# Patient Record
Sex: FEMALE | Race: BLACK OR AFRICAN AMERICAN | Employment: OTHER | URBAN - METROPOLITAN AREA
[De-identification: names, ages, dates, MRNs, and addresses within clinical notes are randomized per-mention and may not be internally consistent; named-entity substitution may affect disease eponyms.]

---

## 2023-09-30 ENCOUNTER — APPOINTMENT (OUTPATIENT)
Dept: CT IMAGING | Facility: HOSPITAL | Age: 63
DRG: 698 | End: 2023-09-30
Attending: EMERGENCY MEDICINE
Payer: MEDICARE

## 2023-09-30 ENCOUNTER — HOSPITAL ENCOUNTER (EMERGENCY)
Facility: HOSPITAL | Age: 63
Discharge: HOME | End: 2023-09-30
Attending: EMERGENCY MEDICINE
Payer: MEDICARE

## 2023-09-30 ENCOUNTER — APPOINTMENT (OUTPATIENT)
Dept: CT IMAGING | Facility: HOSPITAL | Age: 63
End: 2023-09-30
Attending: EMERGENCY MEDICINE
Payer: MEDICARE

## 2023-09-30 ENCOUNTER — HOSPITAL ENCOUNTER (EMERGENCY)
Facility: HOSPITAL | Age: 63
Discharge: HOME | DRG: 698 | End: 2023-09-30
Attending: EMERGENCY MEDICINE
Payer: MEDICARE

## 2023-09-30 VITALS
HEART RATE: 87 BPM | TEMPERATURE: 98 F | RESPIRATION RATE: 16 BRPM | OXYGEN SATURATION: 98 % | SYSTOLIC BLOOD PRESSURE: 119 MMHG | DIASTOLIC BLOOD PRESSURE: 72 MMHG | WEIGHT: 120 LBS

## 2023-09-30 DIAGNOSIS — N39.0 URINARY TRACT INFECTION ASSOCIATED WITH INDWELLING URETHRAL CATHETER, INITIAL ENCOUNTER: Primary | ICD-10-CM

## 2023-09-30 DIAGNOSIS — T83.511A URINARY TRACT INFECTION ASSOCIATED WITH INDWELLING URETHRAL CATHETER, INITIAL ENCOUNTER: Primary | ICD-10-CM

## 2023-09-30 LAB
ALBUMIN SERPL-MCNC: 2.3 G/DL (ref 3.4–5)
ALBUMIN/GLOB SERPL: 0.4 {RATIO} (ref 1–2)
ALP LIVER SERPL-CCNC: 131 U/L
ALT SERPL-CCNC: 10 U/L
ANION GAP SERPL CALC-SCNC: 12 MMOL/L (ref 0–18)
APAP SERPL-MCNC: <2 UG/ML (ref 10–30)
AST SERPL-CCNC: 16 U/L (ref 15–37)
BASOPHILS # BLD AUTO: 0.01 X10(3) UL (ref 0–0.2)
BASOPHILS NFR BLD AUTO: 0.1 %
BILIRUB SERPL-MCNC: 0.3 MG/DL (ref 0.1–2)
BILIRUB UR QL CFM: NEGATIVE
BUN BLD-MCNC: 13 MG/DL (ref 7–18)
BUN/CREAT SERPL: 14.4 (ref 10–20)
CALCIUM BLD-MCNC: 10.8 MG/DL (ref 8.5–10.1)
CHLORIDE SERPL-SCNC: 106 MMOL/L (ref 98–112)
CO2 SERPL-SCNC: 21 MMOL/L (ref 21–32)
COLOR UR: YELLOW
CREAT BLD-MCNC: 0.9 MG/DL
DEPRECATED RDW RBC AUTO: 59.3 FL (ref 35.1–46.3)
EGFRCR SERPLBLD CKD-EPI 2021: 73 ML/MIN/1.73M2 (ref 60–?)
EOSINOPHIL # BLD AUTO: 0.06 X10(3) UL (ref 0–0.7)
EOSINOPHIL NFR BLD AUTO: 0.6 %
ERYTHROCYTE [DISTWIDTH] IN BLOOD BY AUTOMATED COUNT: 17.4 % (ref 11–15)
ETHANOL SERPL-MCNC: <3 MG/DL (ref ?–3)
GLOBULIN PLAS-MCNC: 5.6 G/DL (ref 2.8–4.4)
GLUCOSE BLD-MCNC: 74 MG/DL (ref 70–99)
GLUCOSE UR-MCNC: NORMAL MG/DL
HCT VFR BLD AUTO: 27 %
HGB BLD-MCNC: 8.4 G/DL
HYALINE CASTS #/AREA URNS AUTO: PRESENT /LPF
IMM GRANULOCYTES # BLD AUTO: 0.04 X10(3) UL (ref 0–1)
IMM GRANULOCYTES NFR BLD: 0.4 %
KETONES UR-MCNC: 40 MG/DL
LEUKOCYTE ESTERASE UR QL STRIP.AUTO: 500
LYMPHOCYTES # BLD AUTO: 2.03 X10(3) UL (ref 1–4)
LYMPHOCYTES NFR BLD AUTO: 20.6 %
MCH RBC QN AUTO: 29 PG (ref 26–34)
MCHC RBC AUTO-ENTMCNC: 31.1 G/DL (ref 31–37)
MCV RBC AUTO: 93.1 FL
MONOCYTES # BLD AUTO: 0.79 X10(3) UL (ref 0.1–1)
MONOCYTES NFR BLD AUTO: 8 %
NEUTROPHILS # BLD AUTO: 6.91 X10 (3) UL (ref 1.5–7.7)
NEUTROPHILS # BLD AUTO: 6.91 X10(3) UL (ref 1.5–7.7)
NEUTROPHILS NFR BLD AUTO: 70.3 %
OSMOLALITY SERPL CALC.SUM OF ELEC: 287 MOSM/KG (ref 275–295)
PH UR: 6 [PH] (ref 5–8)
PLATELET # BLD AUTO: 497 10(3)UL (ref 150–450)
POTASSIUM SERPL-SCNC: 4.3 MMOL/L (ref 3.5–5.1)
PROT SERPL-MCNC: 7.9 G/DL (ref 6.4–8.2)
PROT UR-MCNC: 300 MG/DL
RBC # BLD AUTO: 2.9 X10(6)UL
RBC #/AREA URNS AUTO: >10 /HPF
SALICYLATES SERPL-MCNC: 2.1 MG/DL (ref 2.8–20)
SARS-COV-2 RNA RESP QL NAA+PROBE: NOT DETECTED
SODIUM SERPL-SCNC: 139 MMOL/L (ref 136–145)
SP GR UR STRIP: 1.02 (ref 1–1.03)
UROBILINOGEN UR STRIP-ACNC: NORMAL
WBC # BLD AUTO: 9.8 X10(3) UL (ref 4–11)
WBC #/AREA URNS AUTO: >50 /HPF

## 2023-09-30 PROCEDURE — 96372 THER/PROPH/DIAG INJ SC/IM: CPT

## 2023-09-30 PROCEDURE — 93005 ELECTROCARDIOGRAM TRACING: CPT

## 2023-09-30 PROCEDURE — 80053 COMPREHEN METABOLIC PANEL: CPT | Performed by: EMERGENCY MEDICINE

## 2023-09-30 PROCEDURE — 87086 URINE CULTURE/COLONY COUNT: CPT | Performed by: EMERGENCY MEDICINE

## 2023-09-30 PROCEDURE — 99285 EMERGENCY DEPT VISIT HI MDM: CPT

## 2023-09-30 PROCEDURE — 85025 COMPLETE CBC W/AUTO DIFF WBC: CPT | Performed by: EMERGENCY MEDICINE

## 2023-09-30 PROCEDURE — 80143 DRUG ASSAY ACETAMINOPHEN: CPT | Performed by: EMERGENCY MEDICINE

## 2023-09-30 PROCEDURE — 80179 DRUG ASSAY SALICYLATE: CPT | Performed by: EMERGENCY MEDICINE

## 2023-09-30 PROCEDURE — 70450 CT HEAD/BRAIN W/O DYE: CPT | Performed by: EMERGENCY MEDICINE

## 2023-09-30 PROCEDURE — 81001 URINALYSIS AUTO W/SCOPE: CPT | Performed by: EMERGENCY MEDICINE

## 2023-09-30 PROCEDURE — 82077 ASSAY SPEC XCP UR&BREATH IA: CPT | Performed by: EMERGENCY MEDICINE

## 2023-09-30 PROCEDURE — 93010 ELECTROCARDIOGRAM REPORT: CPT

## 2023-09-30 PROCEDURE — 36415 COLL VENOUS BLD VENIPUNCTURE: CPT

## 2023-09-30 RX ORDER — DIPHENHYDRAMINE HYDROCHLORIDE 50 MG/ML
50 INJECTION INTRAMUSCULAR; INTRAVENOUS ONCE
Status: COMPLETED | OUTPATIENT
Start: 2023-09-30 | End: 2023-09-30

## 2023-09-30 RX ORDER — CEPHALEXIN 500 MG/1
500 CAPSULE ORAL 4 TIMES DAILY
Qty: 40 CAPSULE | Refills: 0 | Status: SHIPPED | OUTPATIENT
Start: 2023-09-30 | End: 2023-10-10

## 2023-09-30 RX ORDER — CEPHALEXIN 500 MG/1
500 CAPSULE ORAL ONCE
Status: COMPLETED | OUTPATIENT
Start: 2023-09-30 | End: 2023-09-30

## 2023-09-30 NOTE — ED INITIAL ASSESSMENT (HPI)
Brought by [facility] from [facility] for worsening zayra and paranoia x2 days. Per EMS, patient is normally AOx2. Over the past 2 days she has been stating that \"people are going to cut open my guts and feed them to me, feed them to three big black dogs. \" Having visual hallucinations of people sitting in the corner. On arrival she is jittery, speaking rapidly. Denies SI/HI. Denies pain. NILE GARDNER. Jose in place from [facility]. No personal belongings with patient.   Care companion at bedside for 1:1 observation

## 2023-10-01 ENCOUNTER — HOSPITAL ENCOUNTER (INPATIENT)
Facility: HOSPITAL | Age: 63
LOS: 8 days | Discharge: NURSING, REHAB OR LONG-TERM CARE | End: 2023-10-10
Attending: EMERGENCY MEDICINE | Admitting: INTERNAL MEDICINE
Payer: MEDICARE

## 2023-10-01 ENCOUNTER — HOSPITAL ENCOUNTER (INPATIENT)
Facility: HOSPITAL | Age: 63
LOS: 8 days | Discharge: NURSING, REHAB OR LONG-TERM CARE | DRG: 698 | End: 2023-10-10
Attending: EMERGENCY MEDICINE | Admitting: INTERNAL MEDICINE
Payer: MEDICARE

## 2023-10-01 DIAGNOSIS — N17.9 AKI (ACUTE KIDNEY INJURY) (HCC): ICD-10-CM

## 2023-10-01 DIAGNOSIS — A41.9 SEPSIS DUE TO URINARY TRACT INFECTION: ICD-10-CM

## 2023-10-01 DIAGNOSIS — N39.0 SEPSIS DUE TO URINARY TRACT INFECTION: ICD-10-CM

## 2023-10-01 DIAGNOSIS — N39.0 URINARY TRACT INFECTION ASSOCIATED WITH INDWELLING URETHRAL CATHETER, INITIAL ENCOUNTER: Primary | ICD-10-CM

## 2023-10-01 DIAGNOSIS — T83.511A URINARY TRACT INFECTION ASSOCIATED WITH INDWELLING URETHRAL CATHETER, INITIAL ENCOUNTER: Primary | ICD-10-CM

## 2023-10-01 LAB
ALBUMIN SERPL-MCNC: 2.3 G/DL (ref 3.4–5)
ALBUMIN/GLOB SERPL: 0.4 {RATIO} (ref 1–2)
ALP LIVER SERPL-CCNC: 113 U/L
ALT SERPL-CCNC: 12 U/L
ANION GAP SERPL CALC-SCNC: 12 MMOL/L (ref 0–18)
AST SERPL-CCNC: 20 U/L (ref 15–37)
ATRIAL RATE: 104 BPM
BASOPHILS # BLD AUTO: 0.01 X10(3) UL (ref 0–0.2)
BASOPHILS NFR BLD AUTO: 0.1 %
BILIRUB SERPL-MCNC: 0.4 MG/DL (ref 0.1–2)
BUN BLD-MCNC: 19 MG/DL (ref 7–18)
BUN/CREAT SERPL: 9.9 (ref 10–20)
CALCIUM BLD-MCNC: 11.3 MG/DL (ref 8.5–10.1)
CHLORIDE SERPL-SCNC: 112 MMOL/L (ref 98–112)
CO2 SERPL-SCNC: 22 MMOL/L (ref 21–32)
CREAT BLD-MCNC: 1.92 MG/DL
DEPRECATED RDW RBC AUTO: 58.6 FL (ref 35.1–46.3)
EGFRCR SERPLBLD CKD-EPI 2021: 29 ML/MIN/1.73M2 (ref 60–?)
EOSINOPHIL # BLD AUTO: 0 X10(3) UL (ref 0–0.7)
EOSINOPHIL NFR BLD AUTO: 0 %
ERYTHROCYTE [DISTWIDTH] IN BLOOD BY AUTOMATED COUNT: 17.4 % (ref 11–15)
GLOBULIN PLAS-MCNC: 5.3 G/DL (ref 2.8–4.4)
GLUCOSE BLD-MCNC: 110 MG/DL (ref 70–99)
HCT VFR BLD AUTO: 26.2 %
HGB BLD-MCNC: 8 G/DL
IMM GRANULOCYTES # BLD AUTO: 0.07 X10(3) UL (ref 0–1)
IMM GRANULOCYTES NFR BLD: 0.5 %
LACTATE SERPL-SCNC: 3.3 MMOL/L (ref 0.4–2)
LYMPHOCYTES # BLD AUTO: 1.53 X10(3) UL (ref 1–4)
LYMPHOCYTES NFR BLD AUTO: 11.8 %
MCH RBC QN AUTO: 28.5 PG (ref 26–34)
MCHC RBC AUTO-ENTMCNC: 30.5 G/DL (ref 31–37)
MCV RBC AUTO: 93.2 FL
MONOCYTES # BLD AUTO: 0.88 X10(3) UL (ref 0.1–1)
MONOCYTES NFR BLD AUTO: 6.8 %
NEUTROPHILS # BLD AUTO: 10.53 X10 (3) UL (ref 1.5–7.7)
NEUTROPHILS # BLD AUTO: 10.53 X10(3) UL (ref 1.5–7.7)
NEUTROPHILS NFR BLD AUTO: 80.8 %
OSMOLALITY SERPL CALC.SUM OF ELEC: 305 MOSM/KG (ref 275–295)
P AXIS: 40 DEGREES
P-R INTERVAL: 144 MS
PLATELET # BLD AUTO: 515 10(3)UL (ref 150–450)
POTASSIUM SERPL-SCNC: 4.3 MMOL/L (ref 3.5–5.1)
PROT SERPL-MCNC: 7.6 G/DL (ref 6.4–8.2)
Q-T INTERVAL: 350 MS
QRS DURATION: 82 MS
QTC CALCULATION (BEZET): 460 MS
R AXIS: 37 DEGREES
RBC # BLD AUTO: 2.81 X10(6)UL
SODIUM SERPL-SCNC: 146 MMOL/L (ref 136–145)
T AXIS: 35 DEGREES
VENTRICULAR RATE: 104 BPM
WBC # BLD AUTO: 13 X10(3) UL (ref 4–11)

## 2023-10-02 ENCOUNTER — APPOINTMENT (OUTPATIENT)
Dept: CT IMAGING | Facility: HOSPITAL | Age: 63
End: 2023-10-02
Attending: STUDENT IN AN ORGANIZED HEALTH CARE EDUCATION/TRAINING PROGRAM
Payer: MEDICARE

## 2023-10-02 ENCOUNTER — APPOINTMENT (OUTPATIENT)
Dept: ULTRASOUND IMAGING | Facility: HOSPITAL | Age: 63
End: 2023-10-02
Attending: NURSE PRACTITIONER
Payer: MEDICARE

## 2023-10-02 ENCOUNTER — APPOINTMENT (OUTPATIENT)
Dept: CT IMAGING | Facility: HOSPITAL | Age: 63
DRG: 698 | End: 2023-10-02
Attending: STUDENT IN AN ORGANIZED HEALTH CARE EDUCATION/TRAINING PROGRAM
Payer: MEDICARE

## 2023-10-02 ENCOUNTER — APPOINTMENT (OUTPATIENT)
Dept: ULTRASOUND IMAGING | Facility: HOSPITAL | Age: 63
DRG: 698 | End: 2023-10-02
Attending: NURSE PRACTITIONER
Payer: MEDICARE

## 2023-10-02 PROBLEM — A41.9 SEPSIS DUE TO URINARY TRACT INFECTION: Status: ACTIVE | Noted: 2023-10-02

## 2023-10-02 PROBLEM — D62 ABLA (ACUTE BLOOD LOSS ANEMIA): Status: ACTIVE | Noted: 2023-10-02

## 2023-10-02 PROBLEM — N17.9 AKI (ACUTE KIDNEY INJURY) (HCC): Status: ACTIVE | Noted: 2023-10-02

## 2023-10-02 PROBLEM — N39.0 SEPSIS DUE TO URINARY TRACT INFECTION: Status: ACTIVE | Noted: 2023-10-02

## 2023-10-02 LAB
ANION GAP SERPL CALC-SCNC: 11 MMOL/L (ref 0–18)
ANION GAP SERPL CALC-SCNC: 16 MMOL/L (ref 0–18)
ANTIBODY SCREEN: NEGATIVE
ATRIAL RATE: 111 BPM
BASOPHILS # BLD AUTO: 0.02 X10(3) UL (ref 0–0.2)
BASOPHILS NFR BLD AUTO: 0.1 %
BUN BLD-MCNC: 18 MG/DL (ref 7–18)
BUN BLD-MCNC: 18 MG/DL (ref 7–18)
BUN/CREAT SERPL: 10.2 (ref 10–20)
BUN/CREAT SERPL: 12.2 (ref 10–20)
CALCIUM BLD-MCNC: 10.1 MG/DL (ref 8.5–10.1)
CALCIUM BLD-MCNC: 9.8 MG/DL (ref 8.5–10.1)
CHLORIDE SERPL-SCNC: 121 MMOL/L (ref 98–112)
CHLORIDE SERPL-SCNC: 122 MMOL/L (ref 98–112)
CO2 SERPL-SCNC: 13 MMOL/L (ref 21–32)
CO2 SERPL-SCNC: 18 MMOL/L (ref 21–32)
CREAT BLD-MCNC: 1.48 MG/DL
CREAT BLD-MCNC: 1.77 MG/DL
DEPRECATED HBV CORE AB SER IA-ACNC: 2522 NG/ML
DEPRECATED RDW RBC AUTO: 63.1 FL (ref 35.1–46.3)
EGFRCR SERPLBLD CKD-EPI 2021: 32 ML/MIN/1.73M2 (ref 60–?)
EGFRCR SERPLBLD CKD-EPI 2021: 40 ML/MIN/1.73M2 (ref 60–?)
EOSINOPHIL # BLD AUTO: 0 X10(3) UL (ref 0–0.7)
EOSINOPHIL NFR BLD AUTO: 0 %
ERYTHROCYTE [DISTWIDTH] IN BLOOD BY AUTOMATED COUNT: 17.8 % (ref 11–15)
EST. AVERAGE GLUCOSE BLD GHB EST-MCNC: 105 MG/DL (ref 68–126)
GLUCOSE BLD-MCNC: 108 MG/DL (ref 70–99)
GLUCOSE BLD-MCNC: 95 MG/DL (ref 70–99)
GLUCOSE BLDC GLUCOMTR-MCNC: 94 MG/DL (ref 70–99)
HBA1C MFR BLD: 5.3 % (ref ?–5.7)
HCT VFR BLD AUTO: 21.5 %
HCT VFR BLD AUTO: 25.5 %
HGB BLD-MCNC: 6.3 G/DL
HGB BLD-MCNC: 7.9 G/DL
HGB RETIC QN AUTO: 28.3 PG (ref 28.2–36.6)
IMM GRANULOCYTES # BLD AUTO: 0.07 X10(3) UL (ref 0–1)
IMM GRANULOCYTES NFR BLD: 0.5 %
IMM RETICS NFR: 0.17 RATIO (ref 0.1–0.3)
IRON SATN MFR SERPL: 20 %
IRON SERPL-MCNC: 30 UG/DL
LACTATE SERPL-SCNC: 1.9 MMOL/L (ref 0.4–2)
LACTATE SERPL-SCNC: 4.6 MMOL/L (ref 0.4–2)
LYMPHOCYTES # BLD AUTO: 1.5 X10(3) UL (ref 1–4)
LYMPHOCYTES NFR BLD AUTO: 11.1 %
MCH RBC QN AUTO: 28.4 PG (ref 26–34)
MCHC RBC AUTO-ENTMCNC: 29.3 G/DL (ref 31–37)
MCV RBC AUTO: 96.8 FL
MONOCYTES # BLD AUTO: 0.9 X10(3) UL (ref 0.1–1)
MONOCYTES NFR BLD AUTO: 6.7 %
MRSA DNA SPEC QL NAA+PROBE: POSITIVE
NEUTROPHILS # BLD AUTO: 11 X10 (3) UL (ref 1.5–7.7)
NEUTROPHILS # BLD AUTO: 11 X10(3) UL (ref 1.5–7.7)
NEUTROPHILS NFR BLD AUTO: 81.6 %
OSMOLALITY SERPL CALC.SUM OF ELEC: 312 MOSM/KG (ref 275–295)
OSMOLALITY SERPL CALC.SUM OF ELEC: 314 MOSM/KG (ref 275–295)
P AXIS: 108 DEGREES
P-R INTERVAL: 136 MS
PLATELET # BLD AUTO: 419 10(3)UL (ref 150–450)
POTASSIUM SERPL-SCNC: 4.3 MMOL/L (ref 3.5–5.1)
POTASSIUM SERPL-SCNC: 4.3 MMOL/L (ref 3.5–5.1)
Q-T INTERVAL: 330 MS
QRS DURATION: 80 MS
QTC CALCULATION (BEZET): 448 MS
R AXIS: 70 DEGREES
RBC # BLD AUTO: 2.22 X10(6)UL
RETICS # AUTO: 16.2 X10(3) UL (ref 22.5–147.5)
RETICS/RBC NFR AUTO: 0.7 %
RH BLOOD TYPE: POSITIVE
RH BLOOD TYPE: POSITIVE
SARS-COV-2 RNA RESP QL NAA+PROBE: NOT DETECTED
SODIUM SERPL-SCNC: 150 MMOL/L (ref 136–145)
SODIUM SERPL-SCNC: 151 MMOL/L (ref 136–145)
T AXIS: -26 DEGREES
TIBC SERPL-MCNC: 152 UG/DL (ref 240–450)
TRANSFERRIN SERPL-MCNC: 102 MG/DL (ref 200–360)
VENTRICULAR RATE: 111 BPM
WBC # BLD AUTO: 13.5 X10(3) UL (ref 4–11)

## 2023-10-02 PROCEDURE — 95816 EEG AWAKE AND DROWSY: CPT | Performed by: OTHER

## 2023-10-02 PROCEDURE — 99223 1ST HOSP IP/OBS HIGH 75: CPT | Performed by: INTERNAL MEDICINE

## 2023-10-02 PROCEDURE — 99233 SBSQ HOSP IP/OBS HIGH 50: CPT | Performed by: NURSE PRACTITIONER

## 2023-10-02 PROCEDURE — 36620 INSERTION CATHETER ARTERY: CPT | Performed by: EMERGENCY MEDICINE

## 2023-10-02 PROCEDURE — 70450 CT HEAD/BRAIN W/O DYE: CPT | Performed by: STUDENT IN AN ORGANIZED HEALTH CARE EDUCATION/TRAINING PROGRAM

## 2023-10-02 PROCEDURE — 76770 US EXAM ABDO BACK WALL COMP: CPT | Performed by: NURSE PRACTITIONER

## 2023-10-02 PROCEDURE — 30233N1 TRANSFUSION OF NONAUTOLOGOUS RED BLOOD CELLS INTO PERIPHERAL VEIN, PERCUTANEOUS APPROACH: ICD-10-PCS | Performed by: EMERGENCY MEDICINE

## 2023-10-02 RX ORDER — ACETAMINOPHEN 500 MG
500 TABLET ORAL EVERY 4 HOURS PRN
Status: DISCONTINUED | OUTPATIENT
Start: 2023-10-02 | End: 2023-10-10

## 2023-10-02 RX ORDER — OMEPRAZOLE 20 MG/1
20 CAPSULE, DELAYED RELEASE ORAL
COMMUNITY

## 2023-10-02 RX ORDER — SODIUM BICARBONATE 650 MG/1
650 TABLET ORAL 3 TIMES DAILY
COMMUNITY

## 2023-10-02 RX ORDER — HEPARIN SODIUM 5000 [USP'U]/ML
5000 INJECTION, SOLUTION INTRAVENOUS; SUBCUTANEOUS EVERY 8 HOURS SCHEDULED
Status: DISCONTINUED | OUTPATIENT
Start: 2023-10-02 | End: 2023-10-10

## 2023-10-02 RX ORDER — ASPIRIN 81 MG/1
81 TABLET ORAL DAILY
COMMUNITY

## 2023-10-02 RX ORDER — SENNOSIDES 8.6 MG
17.2 TABLET ORAL NIGHTLY PRN
Status: DISCONTINUED | OUTPATIENT
Start: 2023-10-02 | End: 2023-10-10

## 2023-10-02 RX ORDER — SODIUM CHLORIDE 9 MG/ML
INJECTION, SOLUTION INTRAVENOUS CONTINUOUS
Status: DISCONTINUED | OUTPATIENT
Start: 2023-10-02 | End: 2023-10-02

## 2023-10-02 RX ORDER — SODIUM CHLORIDE 9 MG/ML
INJECTION, SOLUTION INTRAVENOUS ONCE
Status: COMPLETED | OUTPATIENT
Start: 2023-10-02 | End: 2023-10-02

## 2023-10-02 RX ORDER — POLYETHYLENE GLYCOL 3350 17 G/17G
17 POWDER, FOR SOLUTION ORAL DAILY PRN
Status: DISCONTINUED | OUTPATIENT
Start: 2023-10-02 | End: 2023-10-10

## 2023-10-02 RX ORDER — BISACODYL 10 MG
10 SUPPOSITORY, RECTAL RECTAL
Status: DISCONTINUED | OUTPATIENT
Start: 2023-10-02 | End: 2023-10-10

## 2023-10-02 RX ORDER — ENEMA 19; 7 G/133ML; G/133ML
1 ENEMA RECTAL ONCE AS NEEDED
Status: DISCONTINUED | OUTPATIENT
Start: 2023-10-02 | End: 2023-10-10

## 2023-10-02 RX ORDER — ATORVASTATIN CALCIUM 20 MG/1
20 TABLET, FILM COATED ORAL NIGHTLY
COMMUNITY

## 2023-10-02 RX ORDER — SODIUM CHLORIDE 450 MG/100ML
INJECTION, SOLUTION INTRAVENOUS CONTINUOUS
Status: DISCONTINUED | OUTPATIENT
Start: 2023-10-02 | End: 2023-10-05

## 2023-10-02 RX ORDER — HALOPERIDOL 5 MG/ML
2.5 INJECTION INTRAMUSCULAR ONCE
Status: COMPLETED | OUTPATIENT
Start: 2023-10-02 | End: 2023-10-02

## 2023-10-02 RX ORDER — MAGNESIUM OXIDE 400 MG/1
400 TABLET ORAL 2 TIMES DAILY
COMMUNITY

## 2023-10-02 NOTE — ED PROVIDER NOTES
Patient signed out to me by previous provider as awaiting admission, getting sepsis bolus. I received a call from the lab that her lactic acid went up, the patient is still getting her sepsis bolus. Will upgrade to PCU. Patient still remains mildly tacky, BP holding. I notified the hospitalist who is aware.

## 2023-10-02 NOTE — CM/SW NOTE
From [facility] - referral to be sent to them. Will request an update on Eda's status as HANDY or LTC.     Karson Jorge MBA BSN RN CRRN   RN Case Manager  [phone]

## 2023-10-02 NOTE — ED QUICK NOTES
Orders for admission, patient is aware of plan and ready to go upstairs. Any questions, please call ED RN Bird Carter at extension [phone].      Patient Covid vaccination status: Unvaccinated     COVID Test Ordered in ED: None    COVID Suspicion at Admission: N/A    Running Infusions:      Mental Status/LOC at time of transport: a&ox1    Other pertinent information:   CIWA score: N/A   NIH score:  N/A

## 2023-10-02 NOTE — PROGRESS NOTES
On call Barberist 10/02/23    Called to assist with inability to obtain accurate blood pressure. Request for arterial line. R radial site prepped, able to palpate r radial and ulnar pulses, collateral flow confirmed. Hibicleans prep, 20 gauge art line placed and anchored in place. Good waveform with sbp in 110's. Nurse notified  also hgb dropped from 8 to 6.3 did receive sig fluid bolus. Will transfuse 1 unit prbc.

## 2023-10-02 NOTE — SLP NOTE
ADULT SWALLOWING EVALUATION    ASSESSMENT    ASSESSMENT/OVERALL IMPRESSION:  PPE REQUIRED. THIS THERAPIST WORE GLOVES, DROPLET MASK, AND GOGGLES FOR DURATION OF EVALUATION. HANDS WASHED UPON ENTRANCE/EXIT. SLP BSSE orders received and acknowledged. A swallow evaluation warranted as pt at risk for aspiration. Pt afebrile with weak vocal quality, on room air, with oxygen saturation at 100. Pt with no prior hx of dysphagia at [address] [place] [address]. Pt positioned upright in bed with bilateral wrist restraints. [facility] exam completed and overall reduced strength, range of motion, and rate of motion observed. Pt with impaired oral acceptance and bilabial seal observed intermittently. Pt with increased NAOMI. Pt's swallow response appears delayed with reduced hyolaryngeal elevation/excursion. No clinical signs of aspiration (e.g., immediate/delayed throat clear, immediate/delayed cough, wet vocal quality, increased O2 effort) observed across all trials of puree and mildly thick liquids via tsp. Pt uncoordinated with cup sips. Overt signs/symptoms of aspiration observed with thin liquids as evidenced by throat clearing. Advanced solids not administered due to declining RADHA. Oxygen status remained >98 t/o the entire evaluation. Oral cares completed following trials. At this time, pt presents with mild oral dysphagia and probable pharyngeal dysfunction. Recommend a puree diet and mildly thick liquids via tsp with strict adherence to safe swallowing compensatory strategies. Results and recommendations reviewed with RN and family. SLP collaborated with RN for MD diet orders. FCM SCORE: 3/7     PLAN: SLP to f/u x4 meal assessments, monitor CXR, and VFSS if clinically warranted. RECOMMENDATIONS   Diet Recommendations - Solids: Puree  Diet Recommendations - Liquids: Nectar thick liquids/ Mildly thick    Compensatory Strategies Recommended: Liquids via spoon; No straws; Slow rate; Alternate consistencies;Small bites and sips  Aspiration Precautions: Upright position; Slow rate;Small bites and sips; No straw  Medication Administration Recommendations: Crushed in puree  Treatment Plan/Recommendations: Aspiration precautions  Discharge Recommendations/Plan: Undetermined    HISTORY   MEDICAL HISTORY  Reason for Referral: R/O aspiration    Problem List  Principal Problem:    Urinary tract infection associated with indwelling urethral catheter, initial encounter   Active Problems:    TYSON (acute kidney injury) ([facility]    Sepsis due to urinary tract infection       Past Medical History  Past Medical History:   Diagnosis Date    Anemia     Arrhythmia     Congestive heart disease ([facility]     Deep vein thrombosis (HCC)     Disorder of thyroid     Essential hypertension     High blood pressure     High cholesterol     Pulmonary embolism ([facility]        Prior Living Situation: Skilled nursing facility  Diet Prior to Admission: Regular; Thin liquids  Precautions: Aspiration; Restraints    Patient/Family Goals: none stated    SWALLOWING HISTORY  Current Diet Consistency: NPO  Dysphagia History: None at [facility]    Imaging Results:     CT BRAIN 10/2/23:  CONCLUSION:   1. No acute intracranial process by noncontrast CT technique. 2. Chronic right cerebellar lacunar infarct versus perivascular space. [facility]      Dictated by (CST): Toshia Neri MD on 10/02/2023 at 9:44 AM       Finalized by (CST): Toshia Neri MD on 10/02/2023 at 9:47 AM              OBJECTIVE   ORAL MOTOR EXAMINATION  Dentition: Natural (scattered)  Symmetry: Within Functional Limits  Strength:  (reduced)     Range of Motion:  (reduced)  Rate of Motion: Reduced    Voice Quality: Weak  Respiratory Status: Unlabored  Consistencies Trialed: Thin liquids; Nectar thick liquids;Puree  Method of Presentation: Staff/Clinician assistance;Spoon;Cup;Single sips  Patient Positioning: Upright;Midline    Oral Phase of Swallow: Impaired  Bolus Retrieval: Impaired  Bilabial Seal: Impaired  Bolus Formation: Impaired  Bolus Propulsion: Impaired     Retention: Impaired    Pharyngeal Phase of Swallow: Impaired  Laryngeal Elevation Timing: Appears impaired  Laryngeal Elevation Strength: Appears impaired     (Please note: Silent aspiration cannot be evaluated clinically. Videofluoroscopic Swallow Study is required to rule-out silent aspiration.)        GOALS  Goal #1 The patient will tolerate puree consistency and tsp of mildly thick liquids without overt signs or symptoms of aspiration with 100 % accuracy over 2 session(s). In Progress   Goal #2 The patient/family/caregiver will demonstrate understanding and implementation of aspiration precautions and swallow strategies independently over 4 session(s). In Progress   Goal #3 The patient will tolerate trial upgrade of advanced solids consistency and thin liquids without overt signs or symptoms of aspiration with 95 % accuracy over 3 session(s). In Progress   Goal #4 The patient will utilize compensatory strategies as outlined by  BSSE (clinical evaluation) including Slow rate, Small bites, Small sips, Alternate liquids/solids, No straws, Upright 90 degrees, Eliminate distractions, Feed patient with 1:1 feed assistance 100 % of the time across 2 sessions. In Progress       FOLLOW UP  Treatment Plan/Recommendations: Aspiration precautions  Number of Visits to Meet Established Goals: 4  Follow Up Needed (Documentation Required): Yes  SLP Follow-up Date: 10/03/23    Thank you for your referral.   If you have any questions, please contact Maria Esther Mathew, SIA Acevedo  Speech Language Pathologist  Phone Number [phone]

## 2023-10-02 NOTE — CM/SW NOTE
Department  notified of request for rubi MURRELL referrals started. Assigned CM/SW to follow up with pt/family on further discharge planning.      Nara Marie   , [facility]

## 2023-10-02 NOTE — PLAN OF CARE
Restraints maintained for safety measures and protection of arterial line.      Problem: Safety Risk - Non-Violent Restraints  Goal: Patient will remain free from self-harm  Description: INTERVENTIONS:  - Apply the least restrictive restraint to prevent harm  - Notify patient and family of reasons restraints applied  - Assess for any contributing factors to confusion (electrolyte disturbances, delirium, medications)  - Discontinue any unnecessary medical devices as soon as possible  - Assess the patient's physical comfort, circulation, skin condition, hydration, nutrition and elimination needs   - Reorient and redirection as needed  - Assess for the need to continue restraints  Outcome: Progressing

## 2023-10-02 NOTE — CM/SW NOTE
2020: Received a call from Dr. Jorgito Casillas requesting assistance to transfer patient to [facility] per family request. [facility] reached out to [facility] and spoke to [place], face sheet faxed. ER MDs number given as requested for MD to MD report.  2109. EVIE didn't get a call from [facility]. [facility] [place] there bed availability is tight but they are still working on it. They will call MD once they have it figured out. EVIE made aware of the above. 2156: Per ER JERED Soto patient transfer not accepted by [facility].

## 2023-10-02 NOTE — PROCEDURES
EEG report    REFERRING PHYSICIAN: Dick Whiteside,*    PCP and phone number:  No primary care provider on file. None    TECHNIQUE: 21 channels of EEG, 2 channels of EOG, and 1 channel of EKG were recorded utilizing the International 10/20 System. The recording was performed in a digitized monopolar referential format and playback was reformatted into various referential and bipolar montages utilizing appropriate filter settings. Automatic seizure and spike detection programs were utilized throughout the recording. Video was recorded during the study    CLINICAL DATA:  Patient is sent for the evaluation of possible seizures. MEDICATION:  Continuous Medications:   sodium chloride 125 mL/hr at 10/02/23 1120     Scheduled Medications:  No current outpatient medications on file. PRN Medications:  acetaminophen, polyethylene glycol (PEG 3350), sennosides, bisacodyl, fleet enema    ACTIVATION:  Hyperventilation: Not done  Photic stimulation: Not done  Sleep: No clear sleep architecture was seen. BACKGROUND    Background slow activity was seen. Throughout the record, a moderate amount of low to medium voltage, polymorphic, 4-6 Hz slow wave activity was seen diffusely over both hemispheres without localization or lateralization. No clear posterior dominant rhythm was seen. EEG ABNORMALITY  None    IMPRESSION:    This is an abnormal EEG. Diffuse slowing was present continuously. These waveforms are not considered epileptiform in nature. This constellation of findings indicates a moderate degree of cerebral dysfunction consistent with metabolic/hypoxic encephalopathy or bilateral structural process or a medication effect. No focal, lateralized, or epileptiform features are noted.

## 2023-10-02 NOTE — PROGRESS NOTES
[facility]     Hospitalist Progress Note     Rebekah Gallardo Patient Status:  Inpatient    1960 MRN [id number]   Location [facility] 2W/SW Attending Stevo, Gloria Brook Day # 0 PCP No primary care provider on file. Subjective:     Chief Complaint: AMS, sepsis    OVERNIGHT ISSUES: admitted to PCU for spesis    Obtunded, unable to perform HPI      Objective:    Vital signs:   10/02/23  0500 10/02/23  0600 10/02/23  0700 10/02/23  0800   BP: (!) 133/117      BP Location:       Pulse: 84 89 81 80   Resp: 16 19 16 15   Temp:       TempSrc:       SpO2: 97% 100% 99% 93%   Weight:       Height:         Patient Weight for the past 72 hrs:   Weight   10/02/23 0410 119 lb 14.9 oz (54.4 kg)   10/02/23 0415 118 lb 9.7 oz (53.8 kg)       Intake/Output Summary (Last 24 hours) at 10/2/2023 0836  Last data filed at 10/2/2023 0600  Gross per 24 hour   Intake 296 ml   Output --   Net 296 ml       Physical Exam:    General: No acute distress. HEENT: anicteric, atraumatic  Skin: Warm hydrated  Psych: agitated intermittently, requiring restraints  Respiratory: Clear to auscultation bilaterally. No wheezes. No rhonchi. Cardiovascular: S1, S2. Regular rate and rhythm. No murmurs, rubs or gallops. Abdomen: Soft, nontender, nondistended. Positive bowel sounds. No rebound or guarding. Neurologic: L sided weakness, obtunded, wondering R eye   Musculoskeletal: Moves all extremities.   Extremities: No edema L BKA  Lines: arterial line  Garcia    Diagnostic Data:    Labs:  Recent Labs   Lab 23  15223  1753 10/01/23  2001 10/02/23  0429   WBC 10.2 9.8 13.0* 13.5*   HGB 8.3* 8.4* 8.0* 6.3*   MCV 90.5 93.1 93.2 96.8   .0 497.0* 515.0* 419.0       Recent Labs   Lab 23  1527 23  1753 10/01/23  2001 10/02/23  0429   * 74 110* 95   BUN 16 13 19* 18   CREATSERUM 0.94 0.90 1.92* 1.77*   CA 10.4* 10.8* 11.3* 9.8   ALB 2.0* 2.3* 2.3*  --     139 146* 151*   K 4.5 4.3 4.3 4.3    106 112 122*   CO2 22.0 21.0 22.0 18.0*   ALKPHO 127 131* 113  --    AST 15 16 20  --    ALT 9* 10* 12*  --    BILT 0.3 0.3 0.4  --    TP 7.0 7.9 7.6  --        Estimated Creatinine Clearance: 28.3 mL/min (A) (based on SCr of 1.77 mg/dL (H)). No results for input(s): \"PTP\", \"INR\" in the last 168 hours. Culture:  No results found for this visit on 10/01/23. COVID-19  Lab Results   Component Value Date    COVID19 Not Detected 10/02/2023       Cardiac  No results for input(s): \"TROP\", \"PBNP\" in the last 168 hours. Imaging: Imaging data reviewed in [facility]. CT BRAIN OR HEAD (81612)    Result Date: 10/1/2023  CONCLUSION:   No acute intracranial abnormality. Generalized atrophy with mild chronic microvascular white matter ischemia. A preliminary report was issued by the [address] [place] [address] Radiology teleradiology service. There are no major discrepancies. Dictated by (CST): Kristina Leyva MD on 10/01/2023 at 7:21 AM     Finalized by (CST): Kristina Leyva MD on 10/01/2023 at 7:22 AM           Medications:    heparin  5,000 Units Subcutaneous Q8H [address]    sodium chloride  1,000 mL Intravenous Once    meropenem  500 mg Intravenous q12h    OLANZapine (Zyprexa) 5 mg in sterile water for injection (PF) IM injection  5 mg Intramuscular Once    sodium chloride   Intravenous Once       Assessment & Plan:    65 y/o female admitted from [facility] with AMS and UTI. No medical records    #Sepsis  #Recent UTI  ED vistit 9/30 for UTI, discharged on kefelx  ? ?  H/o ESBL:   =elevated lactic acid , down trending with fluid resuscitation,  -elevated wbc and tachycardic on admission, now normalized  -Urine cx pending  -Blood cx pending  -SARS-CoV-2 PCR negative  -PCU  --arterial line: BP stable  =continue IV Meropenum: dose adjusted to q 12 due to TYSON    #Alerted Mental Status  Neuro exam: patient somnolent, minimal response to sternal rub,   R eye wandering  -redent ED visit for AMS: given Zyprexa with improvement of mentation, CT scan 9/30  head showed chronic microvascular white matter ischemia, no cva  -repeat CT head  =EEG  =restraints prn    #Chronic urinary catheter  Recently exchanged, unclear reason, no past medical records  Keep Garcia in place for accurate I&O    #Acute kidney injury  Creatinine baseline unclear, creatinine 0.9 on 9/30  Creatinine on admission 1.9  IVF  BMP daily, monitor    #Hypernatremia  Suspect from sepsis fluid resusictatioin with 0.9ns  3L in ED  -Na 139 on 9.30 --> 151 today  =switch to 0.45ns  -bmp in at 1500 and in am    #Anemia  Labs c/w KERRI  Hgb 6.8  =transfuse 1unit prbc, repeat hgb post transfusion  =Venofer to start tonight  =start IV PPI  =Stool for OB  =GI consult    #PAD  L  AKA 2014  Hold ASA due to anemia  -hold statin, npo    #HTN  Hold metoprolol    #Malnutrition  =dietitian consult    #Fall in March  -hospitalized at [facility] with right leg wound infections    #Chronic venous status Wound in R legs  -hospitalized at [facility] on IV antibx  =check A1c  =wound care consult    FEN: arterial line, piv    DVT Prophylaxis: scd r leg, hold AC due to anemia  CODE status: Full   Disposition family request to [facility], not accepted  MDM: high  Phone conversation with her son Davide Lucio. Updated on patient status and plan of care. Discusses goals of care, wants Full code    Greater than 60 minutes spent, >50% spent counseling re: treatment plan and workup. Reviewed VS, medications, labs, imaging. Discussed plan with RN  Plan of care discussed with patient at bedside. Ilya Rainey 53 Team  [phone]  10/02/23   Rounded with and discussed assessment and plan with Dr. Carlos Kapoor  Is this a shared or split note between Advanced Practice Provider and Physician?  No

## 2023-10-02 NOTE — PROGRESS NOTES
10/02/23 1355   Wound 10/02/23 Friction/Shear Ischium Right   Date First Assessed/Time First Assessed: 10/02/23 0430   Present on Original Admission: Yes  Primary Wound Type: Friction/Shear  Location: Ischium  Wound Location Orientation: Right   Wound Image    Site Assessment Clean;Fragile;Granulation tissue;Bleeding;Moist;Painful;Pink;Red   Closure Not approximated   Drainage Amount Scant   Drainage Description Sanguineous   Treatments Cleansed; Topical (Barrier/Moisturizer/Ointment)   Dressing Aquacel Foam   Dressing Changed Reinforced   Wound Depth (cm) 0.1 cm   Non-staged Wound Description Partial thickness   Jody-wound Assessment Clean;Dry; Intact   Wound Granulation Tissue Red;Pink   Wound Odor None   State of Healing Fully granulated   [REMOVED] Wound 10/02/23 Leg Distal;Lower; Posterior;Right   Final Assessment Date/Final Assessment Time: 10/02/23 1407  Date First Assessed/Time First Assessed: 10/02/23 0430   Location: Leg  Wound Location Orientation: Distal;Lower; Posterior;Right  Wound Description (Comments): EXTREMELY DRY SKIN-NO WOUND  Wo. .. Wound Image    Site Assessment Dry;Fragile   Drainage Amount None   Treatments Topical (Barrier/Moisturizer/Ointment)   Dressing Open to air   Wound Follow Up   Follow up needed No     Pt seen with bedside RN. The right lower leg/heel have extremely dry skin-no wound. Area cleansed with warm soap and water, dried skin removed, Vaseline applied. The right ischium has some areas of open friction and shear. Zinc ointment applied and area covered with a bordered foam. Pillow placed under the right hip, hx of L bka, heel boot placed on right foot. Wound care signing off.

## 2023-10-02 NOTE — CM/SW NOTE
[facility] Transfer Note:  Reason for transfer:   Continuity of care  Request initiated by:  Family Member:      Read ER notes from last night stating that patient was not accepted at [facility]- called [place] transfer center who is bed placement for Dhiraj Flugerardoy and per Viviana Beasley she spoke with the ICU RN this am and told her Dhiraj Flurry remains at capacity  Will follow up 10/3     Family request to transfer to [facility] Levels, [address] , [address] Transitions Leader  [phone]

## 2023-10-02 NOTE — PLAN OF CARE
Patient has had no change in mental status. Head CT in the am, showed no abnormal data. PRBC transfusion started at 8353, no complications Diet changed to pureed/mildly thick per speech evaluation. Restraints continued and all safety measures maintained. Problem: CARDIOVASCULAR - ADULT  Goal: Maintains optimal cardiac output and hemodynamic stability  Description: INTERVENTIONS:  - Monitor vital signs, rhythm, and trends  - Monitor for bleeding, hypotension and signs of decreased cardiac output  - Evaluate effectiveness of vasoactive medications to optimize hemodynamic stability  - Monitor arterial and/or venous puncture sites for bleeding and/or hematoma  - Assess quality of pulses, skin color and temperature  - Assess for signs of decreased coronary artery perfusion - ex.  Angina  - Evaluate fluid balance, assess for edema, trend weights  Outcome: Progressing     Problem: Safety Risk - Non-Violent Restraints  Goal: Patient will remain free from self-harm  Description: INTERVENTIONS:  - Apply the least restrictive restraint to prevent harm  - Notify patient and family of reasons restraints applied  - Assess for any contributing factors to confusion (electrolyte disturbances, delirium, medications)  - Discontinue any unnecessary medical devices as soon as possible  - Assess the patient's physical comfort, circulation, skin condition, hydration, nutrition and elimination needs   - Reorient and redirection as needed  - Assess for the need to continue restraints  10/2/2023 1512 by Nae Stevens, RN  Outcome: Progressing  10/2/2023 1009 by Nae Stevens, RN  Outcome: Progressing     Problem: NEUROLOGICAL - ADULT  Goal: Achieves stable or improved neurological status  Description: INTERVENTIONS  - Assess for and report changes in neurological status  - Initiate measures to prevent increased intracranial pressure  - Maintain blood pressure and fluid volume within ordered parameters to optimize cerebral perfusion and minimize risk of hemorrhage  - Monitor temperature, glucose, and sodium.  Initiate appropriate interventions as ordered  Outcome: Not Progressing

## 2023-10-02 NOTE — ED QUICK NOTES
This RN spoke with Sidney Pulido, nurse from [facility], states that per notes, called ambulance and wanted her evaluated because she was not as interested in conversation, having a failure to thrive, poor PO intake. Intention was for her to go to [place] per son's request, however [facility] diverted to [place] per concerns about medical condition. This RN spoke with son on the phone, is unhappy that pt is at [place], may want her transferred to [place] if there is an admission.

## 2023-10-02 NOTE — PROGRESS NOTES
[facility] Pharmacy Note:  Renal Adjustment for meropenem (MERREM)    Cliff Forte is a 66year old patient who has been prescribed meropenem (MERREM) 1gm every 8 hrs. The estimated creatinine clearance is 26.4 mL/min (A) (based on SCr of 1.92 mg/dL (H)). The dose has been adjusted to meropenem (MERREM) 500 mg every 12 hrs per hospital renal dose adjustment protocol for treatment of UTI. Pharmacy will follow and adjust dose as warranted for additional renal function changes.     Thank you,    Annalise Coppola, PharmD  10/2/2023  4:15 AM

## 2023-10-02 NOTE — ED INITIAL ASSESSMENT (HPI)
Pt to ED via EMS for altered mental status for the past day. Per EMS, pt has been dealing with a UTI, and undergoing treatment for ESBL in the urine, but has been presenting a&o x1, norm is a&o x3-4.  Pt coming from [address] [place] [address].

## 2023-10-02 NOTE — PLAN OF CARE
Pt received from ER, alert but with nonsensical speech, able to state name and year, pt seems to be talking to self, very fidgety. Pupils uneven upon admission assessment MD notified, plan for CT in AM. Unable to get accurate BP readings due to pt agitation and restlessness, arterial line placed by nocturnalist for accurate readings. Hgb 6.3 this AM, orders in for transfusion this AM per nocturnalist. Wrist restraints applied to prevent dislodging of medical equipment. All safety measures in place, frequent nursing rounds made. Problem: Patient Centered Care  Goal: Patient preferences are identified and integrated in the patient's plan of care  Description: Interventions:  - What would you like us to know as we care for you? From [facility]  - Provide timely, complete, and accurate information to patient/family  - Incorporate patient and family knowledge, values, beliefs, and cultural backgrounds into the planning and delivery of care  - Encourage patient/family to participate in care and decision-making at the level they choose  - Honor patient and family perspectives and choices  Outcome: Progressing     Problem: CARDIOVASCULAR - ADULT  Goal: Maintains optimal cardiac output and hemodynamic stability  Description: INTERVENTIONS:  - Monitor vital signs, rhythm, and trends  - Monitor for bleeding, hypotension and signs of decreased cardiac output  - Evaluate effectiveness of vasoactive medications to optimize hemodynamic stability  - Monitor arterial and/or venous puncture sites for bleeding and/or hematoma  - Assess quality of pulses, skin color and temperature  - Assess for signs of decreased coronary artery perfusion - ex.  Angina  - Evaluate fluid balance, assess for edema, trend weights  Outcome: Progressing     Problem: NEUROLOGICAL - ADULT  Goal: Achieves stable or improved neurological status  Description: INTERVENTIONS  - Assess for and report changes in neurological status  - Initiate measures to prevent increased intracranial pressure  - Maintain blood pressure and fluid volume within ordered parameters to optimize cerebral perfusion and minimize risk of hemorrhage  - Monitor temperature, glucose, and sodium.  Initiate appropriate interventions as ordered  Outcome: Progressing

## 2023-10-03 LAB
ANION GAP SERPL CALC-SCNC: 11 MMOL/L (ref 0–18)
BASOPHILS # BLD AUTO: 0.01 X10(3) UL (ref 0–0.2)
BASOPHILS NFR BLD AUTO: 0.1 %
BLOOD TYPE BARCODE: 6200
BUN BLD-MCNC: 18 MG/DL (ref 7–18)
BUN/CREAT SERPL: 14.4 (ref 10–20)
CALCIUM BLD-MCNC: 9.3 MG/DL (ref 8.5–10.1)
CHLORIDE SERPL-SCNC: 119 MMOL/L (ref 98–112)
CO2 SERPL-SCNC: 17 MMOL/L (ref 21–32)
CREAT BLD-MCNC: 1.25 MG/DL
DEPRECATED RDW RBC AUTO: 58.4 FL (ref 35.1–46.3)
EGFRCR SERPLBLD CKD-EPI 2021: 49 ML/MIN/1.73M2 (ref 60–?)
EOSINOPHIL # BLD AUTO: 0.01 X10(3) UL (ref 0–0.7)
EOSINOPHIL NFR BLD AUTO: 0.1 %
ERYTHROCYTE [DISTWIDTH] IN BLOOD BY AUTOMATED COUNT: 18 % (ref 11–15)
GLUCOSE BLD-MCNC: 96 MG/DL (ref 70–99)
HCT VFR BLD AUTO: 22.2 %
HCT VFR BLD AUTO: 25.2 %
HEMOCCULT STL QL: NEGATIVE
HGB BLD-MCNC: 7.1 G/DL
HGB BLD-MCNC: 7.8 G/DL
IMM GRANULOCYTES # BLD AUTO: 0.13 X10(3) UL (ref 0–1)
IMM GRANULOCYTES NFR BLD: 0.9 %
LYMPHOCYTES # BLD AUTO: 1.95 X10(3) UL (ref 1–4)
LYMPHOCYTES NFR BLD AUTO: 12.8 %
MCH RBC QN AUTO: 28.6 PG (ref 26–34)
MCHC RBC AUTO-ENTMCNC: 32 G/DL (ref 31–37)
MCV RBC AUTO: 89.5 FL
MONOCYTES # BLD AUTO: 0.89 X10(3) UL (ref 0.1–1)
MONOCYTES NFR BLD AUTO: 5.8 %
NEUTROPHILS # BLD AUTO: 12.3 X10 (3) UL (ref 1.5–7.7)
NEUTROPHILS # BLD AUTO: 12.3 X10(3) UL (ref 1.5–7.7)
NEUTROPHILS NFR BLD AUTO: 80.3 %
OSMOLALITY SERPL CALC.SUM OF ELEC: 306 MOSM/KG (ref 275–295)
PLATELET # BLD AUTO: 341 10(3)UL (ref 150–450)
POTASSIUM SERPL-SCNC: 3.7 MMOL/L (ref 3.5–5.1)
RBC # BLD AUTO: 2.48 X10(6)UL
SODIUM SERPL-SCNC: 147 MMOL/L (ref 136–145)
UNIT VOLUME: 350 ML
WBC # BLD AUTO: 15.3 X10(3) UL (ref 4–11)

## 2023-10-03 PROCEDURE — 99233 SBSQ HOSP IP/OBS HIGH 50: CPT | Performed by: INTERNAL MEDICINE

## 2023-10-03 PROCEDURE — 99232 SBSQ HOSP IP/OBS MODERATE 35: CPT | Performed by: INTERNAL MEDICINE

## 2023-10-03 RX ORDER — SODIUM CHLORIDE 450 MG/100ML
INJECTION, SOLUTION INTRAVENOUS ONCE
Status: COMPLETED | OUTPATIENT
Start: 2023-10-03 | End: 2023-10-03

## 2023-10-03 NOTE — SLP NOTE
SPEECH DAILY NOTE - INPATIENT    ASSESSMENT & PLAN   ASSESSMENT  PPE REQUIRED. THIS THERAPIST WORE GLOVES, DROPLET MASK, AND GOGGLES FOR DURATION OF EVALUATION. HANDS WASHED UPON ENTRANCE/EXIT. SLP f/u for ongoing dysphagia tx/meal assessment per recommendations of puree/mildly thick liquids via tsp per BSE. RN reports pt with intermittent RADHA and has not eaten today. Pt denies any swallowing challenges. Pt positioned upright in bed with bilateral wrist restraints present. Pt afebrile, tolerating room air with oxygen status 100 prior to the start of oral trials. SLP reviewed aspiration precautions and safe swallowing compensatory strategies with the patient. Safe swallow guidelines remain written on the white board in purple. Diet recommendations remain on the whiteboard in the room. Patient v/u but would benefit from continued education due to confusion. No family at bedside, RN v/u. Provided 1:1 feed assistance, pt tolerates puree and mildly thick liquids via tsp and open cup trials with no overt clinical signs/symptoms of aspiration. Pt trialed with soft solids and prolonged mastication observed with mild-moderate retention. Pt with mild throat clear with thin liquid trials Trials discontinued at this time. Oral/buccal cavities clear of residue. Oxygen status remained >98 t/o the entire session. Respiratory Rate WFL. PLAN: SLP to f/u x3 meal assessments, monitor CXR, and VFSS if clinically warranted. Diet Recommendations - Solids: Puree  Diet Recommendations - Liquids: Nectar thick liquids/ Mildly thick    Compensatory Strategies Recommended: Slow rate; Alternate consistencies;Small bites and sips  Aspiration Precautions: Upright position; Slow rate;Small bites and sips; No straw  Medication Administration Recommendations: Crushed in puree    Patient Experiencing Pain: Yes  Pain Ratin  Pain Location: L leg     Nursing Aware of Pain?: Yes    Discharge Recommendations  Discharge Recommendations/Plan: Undetermined    Treatment Plan  Treatment Plan/Recommendations: Aspiration precautions    Interdisciplinary Communication: Discussed with RN  Recommendations posted at bedside            GOALS  Goal #1 The patient will tolerate puree consistency and  mildly thick liquids without overt signs or symptoms of aspiration with 100 % accuracy over 2 session(s). Pt tolerates puree/mildly thick liquids via tsp and open cup trials with no CSA. Tsp restriction removed. Goal modified 10/3   Goal #2 The patient/family/caregiver will demonstrate understanding and implementation of aspiration precautions and swallow strategies independently over 4 session(s). Education provided regarding aspiration precautions and safe swallow strategies. Pt v/u but would benefit from continued education due to confusion. Recommendations remain on whiteboard. In Progress   Goal #3 The patient will tolerate trial upgrade of advanced solids consistency and thin liquids without overt signs or symptoms of aspiration with 95 % accuracy over 3 session(s). Soft solids and administered and pt with prolonged mastication and mild-mod retention. Mild throat clearing observed with thin liquids. Trials discontinued. Not appropriate for upgrade at this time. In Progress   Goal #4 The patient will utilize compensatory strategies as outlined by  BSSE (clinical evaluation) including Slow rate, Small bites, Small sips, Alternate liquids/solids, No straws, Upright 90 degrees, Eliminate distractions, Feed patient with 1:1 feed assistance 100 % of the time across 2 sessions. Pt remains in bilateral wrist restraints. SLP led 1:1 feed with slow/small bites/sips while upright in bed. No straws utilized. Liquids/solids alternated.    In Progress     FOLLOW UP  Follow Up Needed (Documentation Required): Yes  SLP Follow-up Date: 10/04/23  Number of Visits to Meet Established Goals: 1    Session: 1 following BSE    If you have any questions, please contact Conley Primrose, SIA a[facility]son [facility]  Speech Language Pathologist  Phone Number Ext. [phone] Simple / Intermediate / Complex Repair - Final Wound Length In Cm: 4.6

## 2023-10-03 NOTE — PLAN OF CARE
No acute events overnight. Right wrist restraint in place for maintenance of equipment. Son at bedside this evening, all questions answered. All safety measures in place, frequent nurisng rounds made. Problem: Patient Centered Care  Goal: Patient preferences are identified and integrated in the patient's plan of care  Description: Interventions:  - What would you like us to know as we care for you?   - Provide timely, complete, and accurate information to patient/family  - Incorporate patient and family knowledge, values, beliefs, and cultural backgrounds into the planning and delivery of care  - Encourage patient/family to participate in care and decision-making at the level they choose  - Honor patient and family perspectives and choices  Outcome: Progressing     Problem: CARDIOVASCULAR - ADULT  Goal: Maintains optimal cardiac output and hemodynamic stability  Description: INTERVENTIONS:  - Monitor vital signs, rhythm, and trends  - Monitor for bleeding, hypotension and signs of decreased cardiac output  - Evaluate effectiveness of vasoactive medications to optimize hemodynamic stability  - Monitor arterial and/or venous puncture sites for bleeding and/or hematoma  - Assess quality of pulses, skin color and temperature  - Assess for signs of decreased coronary artery perfusion - ex. Angina  - Evaluate fluid balance, assess for edema, trend weights  Outcome: Progressing     Problem: NEUROLOGICAL - ADULT  Goal: Achieves stable or improved neurological status  Description: INTERVENTIONS  - Assess for and report changes in neurological status  - Initiate measures to prevent increased intracranial pressure  - Maintain blood pressure and fluid volume within ordered parameters to optimize cerebral perfusion and minimize risk of hemorrhage  - Monitor temperature, glucose, and sodium.  Initiate appropriate interventions as ordered  Outcome: Progressing     Problem: Safety Risk - Non-Violent Restraints  Goal: Patient will remain free from self-harm  Description: INTERVENTIONS:  - Apply the least restrictive restraint to prevent harm  - Notify patient and family of reasons restraints applied  - Assess for any contributing factors to confusion (electrolyte disturbances, delirium, medications)  - Discontinue any unnecessary medical devices as soon as possible  - Assess the patient's physical comfort, circulation, skin condition, hydration, nutrition and elimination needs   - Reorient and redirection as needed  - Assess for the need to continue restraints  Outcome: Progressing

## 2023-10-03 NOTE — CDS QUERY
Cdi update: metabolic encephalopathy documented in terrance hamilton note from 10/03/23. [facility]  CLINICAL DOCUMENTATION CLARIFICATION FORM  Dear Joaquín HAMILTON or Dr. January Delgado   ,     [address] [place] [address] (X) ALL DIAGNOSES THAT APPLY. SELECTION BY PROVIDER ONLY        ( )  Metabolic Encephalopathy    ( )  Toxic Encephalopathy (please specify toxic substance):     ( )  Other (please specify)           Brought to ED from nursing facility for worsening mentation. Previous admission to ER for hallucinations and confusion and was found to have a UTI and started on Keflex   Had some improvement of mentation after being given zyprexa however patients confusion returned triggering this ER admission   Usually Oriented x4 but currently oriented to self only   Found to have UTI related to chronic indwelling young catheter - Receiving Meropenem IV   10/02- EEG results: moderate degree of cerebral dysfunction consistent with  metabolic/hypoxic encephalopathy or bilateral structural process or a medication effect. No focal, lateralized, or epileptiform features are noted. Brain Ct NEGATIVE for acute cva but did show chronic right cerebellar lacunar infact   Restraints PRN-- MRI of brain pending   PN 10/03 states \"patient somnolent, minimal response to sternal rub. \"   If you have any questions, please contact Clinical :  Jaime Patterson RN at [phone]  . Thank You!      THIS FORM IS A PERMANENT PART OF THE MEDICAL RECORD

## 2023-10-03 NOTE — CM/SW NOTE
[facility] Transfer Note:  Reason for transfer:   Continuity of care  Request initiated by:  Family Member:       called [facility] who is bed placement for Lilo Kayla and per Kim Terrazas remains at capacity     Family request to transfer to [facility] with son Alan Martinez via phone  5pm  No beds available at [facility], [address] , [address]  [phone]

## 2023-10-04 ENCOUNTER — APPOINTMENT (OUTPATIENT)
Dept: GENERAL RADIOLOGY | Facility: HOSPITAL | Age: 63
End: 2023-10-04
Attending: INTERNAL MEDICINE
Payer: MEDICARE

## 2023-10-04 ENCOUNTER — APPOINTMENT (OUTPATIENT)
Dept: GENERAL RADIOLOGY | Facility: HOSPITAL | Age: 63
End: 2023-10-04
Attending: NURSE PRACTITIONER
Payer: MEDICARE

## 2023-10-04 ENCOUNTER — APPOINTMENT (OUTPATIENT)
Dept: GENERAL RADIOLOGY | Facility: HOSPITAL | Age: 63
DRG: 698 | End: 2023-10-04
Attending: NURSE PRACTITIONER
Payer: MEDICARE

## 2023-10-04 ENCOUNTER — APPOINTMENT (OUTPATIENT)
Dept: MRI IMAGING | Facility: HOSPITAL | Age: 63
End: 2023-10-04
Attending: NURSE PRACTITIONER
Payer: MEDICARE

## 2023-10-04 ENCOUNTER — APPOINTMENT (OUTPATIENT)
Dept: GENERAL RADIOLOGY | Facility: HOSPITAL | Age: 63
DRG: 698 | End: 2023-10-04
Attending: INTERNAL MEDICINE
Payer: MEDICARE

## 2023-10-04 ENCOUNTER — APPOINTMENT (OUTPATIENT)
Dept: MRI IMAGING | Facility: HOSPITAL | Age: 63
DRG: 698 | End: 2023-10-04
Attending: NURSE PRACTITIONER
Payer: MEDICARE

## 2023-10-04 PROBLEM — R34 OLIGOURIA: Status: ACTIVE | Noted: 2023-10-04

## 2023-10-04 LAB
ANION GAP SERPL CALC-SCNC: 8 MMOL/L (ref 0–18)
BASOPHILS # BLD AUTO: 0.01 X10(3) UL (ref 0–0.2)
BASOPHILS NFR BLD AUTO: 0.1 %
BUN BLD-MCNC: 17 MG/DL (ref 7–18)
BUN/CREAT SERPL: 19.1 (ref 10–20)
C DIFF TOX B STL QL: NEGATIVE
CALCIUM BLD-MCNC: 9.6 MG/DL (ref 8.5–10.1)
CHLORIDE SERPL-SCNC: 117 MMOL/L (ref 98–112)
CO2 SERPL-SCNC: 18 MMOL/L (ref 21–32)
CREAT BLD-MCNC: 0.89 MG/DL
DEPRECATED RDW RBC AUTO: 58.3 FL (ref 35.1–46.3)
EGFRCR SERPLBLD CKD-EPI 2021: 74 ML/MIN/1.73M2 (ref 60–?)
EOSINOPHIL # BLD AUTO: 0.01 X10(3) UL (ref 0–0.7)
EOSINOPHIL NFR BLD AUTO: 0.1 %
ERYTHROCYTE [DISTWIDTH] IN BLOOD BY AUTOMATED COUNT: 17.7 % (ref 11–15)
GLUCOSE BLD-MCNC: 86 MG/DL (ref 70–99)
HCT VFR BLD AUTO: 25.2 %
HGB BLD-MCNC: 8 G/DL
IMM GRANULOCYTES # BLD AUTO: 0.17 X10(3) UL (ref 0–1)
IMM GRANULOCYTES NFR BLD: 1.3 %
LYMPHOCYTES # BLD AUTO: 1.4 X10(3) UL (ref 1–4)
LYMPHOCYTES NFR BLD AUTO: 10.5 %
MCH RBC QN AUTO: 28.5 PG (ref 26–34)
MCHC RBC AUTO-ENTMCNC: 31.7 G/DL (ref 31–37)
MCV RBC AUTO: 89.7 FL
MONOCYTES # BLD AUTO: 0.75 X10(3) UL (ref 0.1–1)
MONOCYTES NFR BLD AUTO: 5.6 %
NEUTROPHILS # BLD AUTO: 10.95 X10 (3) UL (ref 1.5–7.7)
NEUTROPHILS # BLD AUTO: 10.95 X10(3) UL (ref 1.5–7.7)
NEUTROPHILS NFR BLD AUTO: 82.4 %
NT-PROBNP SERPL-MCNC: 3156 PG/ML (ref ?–125)
OSMOLALITY SERPL CALC.SUM OF ELEC: 297 MOSM/KG (ref 275–295)
PLATELET # BLD AUTO: 393 10(3)UL (ref 150–450)
POTASSIUM SERPL-SCNC: 3.6 MMOL/L (ref 3.5–5.1)
RBC # BLD AUTO: 2.81 X10(6)UL
SODIUM SERPL-SCNC: 143 MMOL/L (ref 136–145)
SODIUM SERPL-SCNC: 45 MMOL/L
WBC # BLD AUTO: 13.3 X10(3) UL (ref 4–11)

## 2023-10-04 PROCEDURE — 99223 1ST HOSP IP/OBS HIGH 75: CPT | Performed by: INTERNAL MEDICINE

## 2023-10-04 PROCEDURE — 70551 MRI BRAIN STEM W/O DYE: CPT | Performed by: NURSE PRACTITIONER

## 2023-10-04 PROCEDURE — 71045 X-RAY EXAM CHEST 1 VIEW: CPT | Performed by: INTERNAL MEDICINE

## 2023-10-04 PROCEDURE — 71045 X-RAY EXAM CHEST 1 VIEW: CPT | Performed by: NURSE PRACTITIONER

## 2023-10-04 PROCEDURE — 99233 SBSQ HOSP IP/OBS HIGH 50: CPT | Performed by: INTERNAL MEDICINE

## 2023-10-04 RX ORDER — FUROSEMIDE 10 MG/ML
40 INJECTION INTRAMUSCULAR; INTRAVENOUS ONCE
Status: COMPLETED | OUTPATIENT
Start: 2023-10-04 | End: 2023-10-04

## 2023-10-04 NOTE — OCCUPATIONAL THERAPY NOTE
Patient off the floor for testing; will re-attempt later schedule permitting.      Arjun Courser, Occupational Therapist, OTR/L ext [phone]

## 2023-10-04 NOTE — CM/SW NOTE
[facility] Transfer Note:     Spoke to Aftab at [facility] transfer [place]. Suad Dalton is on hold for transfers. Madhuri stated the pt is on the list and will notify us when a bed is available.

## 2023-10-04 NOTE — CM/SW NOTE
Lucie ARMAS requesting assistance with records from [facility]. Unable to pull up clinical from Care Everywhere. SW will follow-up with BTE liaison on 10/4/23 to see if there is clinical she can assist our needs by providing. / to remain available for support and/or discharge planning.       Blake DE LA GARZAA BSN RN CRRN   RN Case Manager  [phone]

## 2023-10-04 NOTE — PLAN OF CARE
Patient is A&Ox3, on room air. On contact precaution. Diminished urine output noted. Wound dressing changed. Bed bath given. Bed is locked and in lowest position. Safety measures maintained. Call light is within reach. Problem: Patient Centered Care  Goal: Patient preferences are identified and integrated in the patient's plan of care  Description: Interventions:  - What would you like us to know as we care for you?   - Provide timely, complete, and accurate information to patient/family  - Incorporate patient and family knowledge, values, beliefs, and cultural backgrounds into the planning and delivery of care  - Encourage patient/family to participate in care and decision-making at the level they choose  - Honor patient and family perspectives and choices  Outcome: Progressing     Problem: Patient/Family Goals  Goal: Patient/Family Long Term Goal  Description: Patient's Long Term Goal: to go bacl to [facility]    Interventions:  - follow MD orders  - follow medication regimen  - See additional Care Plan goals for specific interventions  Outcome: Progressing  Goal: Patient/Family Short Term Goal  Description: Patient's Short Term Goal:     Interventions:   -   - See additional Care Plan goals for specific interventions  Outcome: Progressing     Problem: CARDIOVASCULAR - ADULT  Goal: Maintains optimal cardiac output and hemodynamic stability  Description: INTERVENTIONS:  - Monitor vital signs, rhythm, and trends  - Monitor for bleeding, hypotension and signs of decreased cardiac output  - Evaluate effectiveness of vasoactive medications to optimize hemodynamic stability  - Monitor arterial and/or venous puncture sites for bleeding and/or hematoma  - Assess quality of pulses, skin color and temperature  - Assess for signs of decreased coronary artery perfusion - ex.  Angina  - Evaluate fluid balance, assess for edema, trend weights  Outcome: Progressing     Problem: NEUROLOGICAL - ADULT  Goal: Achieves stable or improved neurological status  Description: INTERVENTIONS  - Assess for and report changes in neurological status  - Initiate measures to prevent increased intracranial pressure  - Maintain blood pressure and fluid volume within ordered parameters to optimize cerebral perfusion and minimize risk of hemorrhage  - Monitor temperature, glucose, and sodium.  Initiate appropriate interventions as ordered  Outcome: Progressing     Problem: Safety Risk - Non-Violent Restraints  Goal: Patient will remain free from self-harm  Description: INTERVENTIONS:  - Apply the least restrictive restraint to prevent harm  - Notify patient and family of reasons restraints applied  - Assess for any contributing factors to confusion (electrolyte disturbances, delirium, medications)  - Discontinue any unnecessary medical devices as soon as possible  - Assess the patient's physical comfort, circulation, skin condition, hydration, nutrition and elimination needs   - Reorient and redirection as needed  - Assess for the need to continue restraints  Outcome: Progressing

## 2023-10-04 NOTE — CDS QUERY
SEPSIS RELATED TO Marylee Simas FORM  Dear Doctor: Gwendolyn HAMILTON     Please clarify if Sepsis with UTI  is related to or due to the Chronic Indwelling Garcia Catheter? PLEASE (X) ALL DIAGNOSES THAT APPLY. THIS SECTION FOR PROVIDER DOCUMENTATION ONLY    ( X )  Yes, the above conditions are related to or due to each other. ( ) No, the above conditions are unrelated to each other     ( )  Other (please specify):         #Sepsis  #Recent UTI  ED vistit 9/30 for UTI, discharged on kefelx  ? ? H/o ESBL:   =elevated lactic acid , down trending with fluid resuscitation,  -elevated wbc and tachycardic on admission, now normalized  -Urine cx neg  -Blood cx neg  -SARS-CoV-2 PCR negative  -has chronic wounds R leg  -PCU  --arterial line: BP stable  -continue IV Meropenum: dose adjusted to q 12 due to TYSON    Garcia placed prior to admission   If you have any questions, please contact Clinical :  Janette Espinal RN at [phone]     Thank You!     THIS FORM IS A PERMANENT PART OF THE MEDICAL RECORD

## 2023-10-04 NOTE — PLAN OF CARE
Problem: CARDIOVASCULAR - ADULT  Goal: Maintains optimal cardiac output and hemodynamic stability  Description: INTERVENTIONS:  - Monitor vital signs, rhythm, and trends  - Monitor for bleeding, hypotension and signs of decreased cardiac output  - Evaluate effectiveness of vasoactive medications to optimize hemodynamic stability  - Monitor arterial and/or venous puncture sites for bleeding and/or hematoma  - Assess quality of pulses, skin color and temperature  - Assess for signs of decreased coronary artery perfusion - ex. Angina  - Evaluate fluid balance, assess for edema, trend weights  Outcome: Progressing     Problem: NEUROLOGICAL - ADULT  Goal: Achieves stable or improved neurological status  Description: INTERVENTIONS  - Assess for and report changes in neurological status  - Initiate measures to prevent increased intracranial pressure  - Maintain blood pressure and fluid volume within ordered parameters to optimize cerebral perfusion and minimize risk of hemorrhage  - Monitor temperature, glucose, and sodium.  Initiate appropriate interventions as ordered  Outcome: Progressing     Problem: Safety Risk - Non-Violent Restraints  Goal: Patient will remain free from self-harm  Description: INTERVENTIONS:  - Apply the least restrictive restraint to prevent harm  - Notify patient and family of reasons restraints applied  - Assess for any contributing factors to confusion (electrolyte disturbances, delirium, medications)  - Discontinue any unnecessary medical devices as soon as possible  - Assess the patient's physical comfort, circulation, skin condition, hydration, nutrition and elimination needs   - Reorient and redirection as needed  - Assess for the need to continue restraints  10/3/2023 2000 by Nova Muller RN  Outcome: Progressing  10/3/2023 1825 by Nova Muller, RN  Outcome: Progressing

## 2023-10-04 NOTE — CM/SW NOTE
Care Coordination [facility] Transfer Note:    Reached out to [facility]. She requested the new order for tele to be faxed. Order was faxed to [phone]. Pending transfer to [facility]. Pending bed availability and accepting MD at [facility].

## 2023-10-04 NOTE — CM/SW NOTE
Care Coordination [facility] Transfer Note:     Spoke to Ghulam Murray, the pt's son, and he is cancelling the transfer to [place]. [facility] notified to cancel the transfer.

## 2023-10-05 LAB
ANION GAP SERPL CALC-SCNC: 8 MMOL/L (ref 0–18)
BUN BLD-MCNC: 16 MG/DL (ref 7–18)
BUN/CREAT SERPL: 21.9 (ref 10–20)
CALCIUM BLD-MCNC: 9.4 MG/DL (ref 8.5–10.1)
CHLORIDE SERPL-SCNC: 116 MMOL/L (ref 98–112)
CO2 SERPL-SCNC: 21 MMOL/L (ref 21–32)
CREAT BLD-MCNC: 0.73 MG/DL
DEPRECATED RDW RBC AUTO: 58.9 FL (ref 35.1–46.3)
EGFRCR SERPLBLD CKD-EPI 2021: 94 ML/MIN/1.73M2 (ref 60–?)
ERYTHROCYTE [DISTWIDTH] IN BLOOD BY AUTOMATED COUNT: 17.6 % (ref 11–15)
GLUCOSE BLD-MCNC: 93 MG/DL (ref 70–99)
HCT VFR BLD AUTO: 25.2 %
HGB BLD-MCNC: 7.8 G/DL
MCH RBC QN AUTO: 28.1 PG (ref 26–34)
MCHC RBC AUTO-ENTMCNC: 31 G/DL (ref 31–37)
MCV RBC AUTO: 90.6 FL
OSMOLALITY SERPL CALC.SUM OF ELEC: 301 MOSM/KG (ref 275–295)
PLATELET # BLD AUTO: 390 10(3)UL (ref 150–450)
POTASSIUM SERPL-SCNC: 3.3 MMOL/L (ref 3.5–5.1)
RBC # BLD AUTO: 2.78 X10(6)UL
SODIUM SERPL-SCNC: 145 MMOL/L (ref 136–145)
T4 FREE SERPL-MCNC: 1.4 NG/DL (ref 0.8–1.7)
TSI SER-ACNC: 0.01 MIU/ML (ref 0.36–3.74)
WBC # BLD AUTO: 10 X10(3) UL (ref 4–11)

## 2023-10-05 PROCEDURE — 99233 SBSQ HOSP IP/OBS HIGH 50: CPT | Performed by: INTERNAL MEDICINE

## 2023-10-05 PROCEDURE — 99232 SBSQ HOSP IP/OBS MODERATE 35: CPT | Performed by: INTERNAL MEDICINE

## 2023-10-05 RX ORDER — FUROSEMIDE 10 MG/ML
40 INJECTION INTRAMUSCULAR; INTRAVENOUS ONCE
Status: DISCONTINUED | OUTPATIENT
Start: 2023-10-05 | End: 2023-10-05

## 2023-10-05 RX ORDER — FUROSEMIDE 10 MG/ML
20 INJECTION INTRAMUSCULAR; INTRAVENOUS ONCE
Status: COMPLETED | OUTPATIENT
Start: 2023-10-05 | End: 2023-10-05

## 2023-10-05 NOTE — PLAN OF CARE
Problem: Patient Centered Care  Goal: Patient preferences are identified and integrated in the patient's plan of care  Description: Interventions:  - What would you like us to know as we care for you?   - Provide timely, complete, and accurate information to patient/family  - Incorporate patient and family knowledge, values, beliefs, and cultural backgrounds into the planning and delivery of care  - Encourage patient/family to participate in care and decision-making at the level they choose  - Honor patient and family perspectives and choices  Outcome: Progressing     Problem: Patient/Family Goals  Goal: Patient/Family Long Term Goal  Description: Patient's Long Term Goal: to be discharged to nursing home    Interventions:  - Completion of IV antibiotics as per MD order  - See additional Care Plan goals for specific interventions  Outcome: Progressing  Goal: Patient/Family Short Term Goal  Description: Patient's Short Term Goal: to eat more    Interventions:   - Up in chair/chair position during meal times  - Encourage/Offer nutritional shakes as per order  - See additional Care Plan goals for specific interventions  Outcome: Progressing     Problem: CARDIOVASCULAR - ADULT  Goal: Maintains optimal cardiac output and hemodynamic stability  Description: INTERVENTIONS:  - Monitor vital signs, rhythm, and trends  - Monitor for bleeding, hypotension and signs of decreased cardiac output  - Evaluate effectiveness of vasoactive medications to optimize hemodynamic stability  - Monitor arterial and/or venous puncture sites for bleeding and/or hematoma  - Assess quality of pulses, skin color and temperature  - Assess for signs of decreased coronary artery perfusion - ex.  Angina  - Evaluate fluid balance, assess for edema, trend weights  Outcome: Progressing     Problem: NEUROLOGICAL - ADULT  Goal: Achieves stable or improved neurological status  Description: INTERVENTIONS  - Assess for and report changes in neurological status  - Initiate measures to prevent increased intracranial pressure  - Maintain blood pressure and fluid volume within ordered parameters to optimize cerebral perfusion and minimize risk of hemorrhage  - Monitor temperature, glucose, and sodium. Initiate appropriate interventions as ordered  Outcome: Progressing  Pt  A&O x3, son visiting on pms, vss, occ pvcs, lungs clear with diminished breath sounds, 2lnc, weaned to 1lnc, may be off by am, good u/o, sm soft brown stool, cleaned with gentle cleanser, pt is tender in perineal area, protective barrier applied. Pt requesting something for gen pain/discomfort, tylenol 500mg crushed and taken with thickened liquid. Bed locked in low position, call lite in reach.

## 2023-10-05 NOTE — CM/SW NOTE
Patient is from [facility], she was admitted there from [facility] on 8/29. Patient discussed in rounds, plan to transfer to the floor today with potential discharge tomorrow. Updates sent to BTE, confirmed plan w/ patient.     Korey Sellers, [address]

## 2023-10-05 NOTE — PLAN OF CARE
Problem: Safety Risk - Non-Violent Restraints  Goal: Patient will remain free from self-harm  Description: INTERVENTIONS:  - Apply the least restrictive restraint to prevent harm  - Notify patient and family of reasons restraints applied  - Assess for any contributing factors to confusion (electrolyte disturbances, delirium, medications)  - Discontinue any unnecessary medical devices as soon as possible  - Assess the patient's physical comfort, circulation, skin condition, hydration, nutrition and elimination needs   - Reorient and redirection as needed  - Assess for the need to continue restraints  Outcome: Completed     Problem: NEUROLOGICAL - ADULT  Goal: Achieves stable or improved neurological status  Description: INTERVENTIONS  - Assess for and report changes in neurological status  - Initiate measures to prevent increased intracranial pressure  - Maintain blood pressure and fluid volume within ordered parameters to optimize cerebral perfusion and minimize risk of hemorrhage  - Monitor temperature, glucose, and sodium. Initiate appropriate interventions as ordered  Outcome: Progressing     Problem: CARDIOVASCULAR - ADULT  Goal: Maintains optimal cardiac output and hemodynamic stability  Description: INTERVENTIONS:  - Monitor vital signs, rhythm, and trends  - Monitor for bleeding, hypotension and signs of decreased cardiac output  - Evaluate effectiveness of vasoactive medications to optimize hemodynamic stability  - Monitor arterial and/or venous puncture sites for bleeding and/or hematoma  - Assess quality of pulses, skin color and temperature  - Assess for signs of decreased coronary artery perfusion - ex.  Angina  - Evaluate fluid balance, assess for edema, trend weights  Outcome: Progressing     Problem: Patient/Family Goals  Goal: Patient/Family Long Term Goal  Description: Patient's Long Term Goal: to be discharged to nursing home    Interventions:  - Completion of IV antibiotics as per MD order  - See additional Care Plan goals for specific interventions  Outcome: Progressing  Goal: Patient/Family Short Term Goal  Description: Patient's Short Term Goal: to eat more    Interventions:   - Up in chair/chair position during meal times  - Encourage/Offer nutritional shakes as per order  - See additional Care Plan goals for specific interventions  Outcome: Progressing     Problem: Patient Centered Care  Goal: Patient preferences are identified and integrated in the patient's plan of care  Description: Interventions:  - Provide timely, complete, and accurate information to patient/family  - Incorporate patient and family knowledge, values, beliefs, and cultural backgrounds into the planning and delivery of care  - Encourage patient/family to participate in care and decision-making at the level they choose  - Honor patient and family perspectives and choices  Outcome: Progressing     Oriented x 3, able to follow commands, NSR/ST on tele-monitor, Remains on Oxy INH 2L/min per NC. Tolerating PO diet though poor appetite, only tolerating a few spoonful of soup/thickened liquids. Indwelling young cath patent, bladder scanned 93ml. Bladder irrigated as per order with 180ml of 0.9NS, with 175ml return, sediments and yellow clots noted. Lasix 40mg IVP one dose given as per order. Remains of IVF at this time. Dressing change to sacrum/buttocks done as per orders. Complaints of generalized pain, Tylenol PO PRN given. Patient and family updated in plan of care, bed locked in lowest position, safety measures maintained, call light within reach.

## 2023-10-05 NOTE — PROGRESS NOTES
EKG on tele-monitor not optimal, noisy/artifact noted but occasionally able to read HR; though vital signs carry over on flowsheet. X2-monitor, telemetry leads and EKG wires changed but little improvement. Called [employer], representative also exchanged X2 monitor and stated it is not X2 monitor, leads or wires; states on \"rare occasion where patient may have something on their body or the machines in room may interfere with EKG read\" but unable to provide solution.

## 2023-10-05 NOTE — SLP NOTE
SPEECH DAILY NOTE - INPATIENT    ASSESSMENT & PLAN   ASSESSMENT  PPE REQUIRED. THIS THERAPIST WORE GLOVES, GOWN, AND MASK FOR DURATION OF EVALUATION. HANDS WASHED UPON ENTRANCE/EXIT. SLP f/u for ongoing dysphagia tx/meal assessment per recommendations of puree/mildly thick liquids per BSE. RN reports pt tolerates diet and medication well with no overt clinical s/s aspiration. Pt denies any swallowing challenges. Pt positioned upright in bed, alert/cooperative. Pt afebrile, tolerating 1L/Min O2NC with oxygen status 100% prior to the start of oral trials. SLP reviewed aspiration precautions and safe swallowing compensatory strategies with the patient. Safe swallow guidelines remain written on the white board in purple. Patient v/u, no family present. Provided 1:1 assistance, pt tolerates puree and mildly thick liquids via cup with no overt clinical signs/symptoms of aspiration. Pt presented with trials of hard/soft solids and thin liquids via cup/straw. Pt with adequate oral acceptance and bilabial seal. Pt with intact bite, prolonged mastication of hard solids, and delayed A/P transfer. Pharyngeal swallow response appears delayed with reduced laryngeal elevation/excursion. No clinical signs of aspiration (e.g., immediate/delayed throat clear, immediate/delayed cough, wet vocal quality, increased O2 effort) observed across all trials. Oxygen status remained stable t/o the entire session. Recommend remain upgrade to soft bite sized/thin liquids with adherence to aspiration precautions and swallow strategies. SLP to f/u with meal assessment x2, monitor  CXR, and VFSS if any overt CSA and/or decline in CXR. RN alerted with results and recommendations. MOST RECENT CXR 10/4  CONCLUSION: No acute cardiopulmonary abnormality. Stable cardiomegaly. Diet Recommendations - Solids: Mechanical soft chopped/ Soft & Bite Sized  Diet Recommendations - Liquids:  Thin Liquids    Compensatory Strategies Recommended: Slow rate;Small bites and sips  Aspiration Precautions: Upright position; Slow rate;Small bites and sips  Medication Administration Recommendations:  (as tolerated)    Patient Experiencing Pain: No  Pain Rating: na  Pain Location: na     Nursing Aware of Pain?: na    Discharge Recommendations  Discharge Recommendations/Plan: Undetermined    Treatment Plan  Treatment Plan/Recommendations: Aspiration precautions    Interdisciplinary Communication: Discussed with RN  Plan posted at bedside            GOALS  Goal #1 The patient will tolerate puree consistency and  mildly thick liquids without overt signs or symptoms of aspiration with 100 % accuracy over 2 session(s). Goal Met 10/5   Goal #2 The patient/family/caregiver will demonstrate understanding and implementation of aspiration precautions and swallow strategies independently over 4 session(s). Education provided regarding aspiration precautions and safe swallow strategies. Pt v/u but would benefit from continued education. Recommendations remain on whiteboard. In Progress   Goal #3 The patient will tolerate trial upgrade of advanced solids consistency and thin liquids without overt signs or symptoms of aspiration with 95 % accuracy over 3 session(s). Tolerated trials of hard/soft solids and thin liquids without overt s/s of aspiration with 100% acc over 2nd session. Rx upgrade to soft bite sized/thin liquids. 10/5  In Progress   Goal #4 The patient will utilize compensatory strategies as outlined by  BSSE (clinical evaluation) including Slow rate, Small bites, Small sips, Alternate liquids/solids, No straws, Upright 90 degrees, Eliminate distractions, Feed patient with 1:1 feed assistance 100 % of the time across 2 sessions. SLP led 1:1 feed with slow/small bites/sips while upright in bed. No straws utilized. Liquids/solids alternated.    In Progress     FOLLOW UP  Follow Up Needed (Documentation Required): Yes  SLP Follow-up Date: 10/06/23  Number of Visits to Meet Established Goals: 4    Session: 2    If you have any questions, please contact SIA Goldman. Edward  Pathologist  Phone Number Ext. [phone]

## 2023-10-05 NOTE — PHYSICAL THERAPY NOTE
PHYSICAL THERAPY EVALUATION - INPATIENT     Room Number: 218/218-A  Evaluation Date: 10/5/2023  Type of Evaluation: Initial   Physician Order: PT Eval and Treat    Presenting Problem: UTI  Reason for Therapy: Mobility Dysfunction and Discharge Planning    PHYSICAL THERAPY ASSESSMENT   Orders received and chart reviewed. JERED Schaeffer approved participation in physical therapy. Session coordinated with OT. PPE worn by therapist: mask, gloves, and gown. Patient was not wearing a mask during session. Patient is a 66year old female admitted 10/1/2023 for Presenting Problem: UTI. Patient presented in bed with 0/10 pain. Education provided on Physical therapy plan of care and physiological benefits of out of bed mobility. Patient with good carryover. Patient agreeable to sitting edge of bed with encouragement. Patient with history of left BKA ten years ago. Patient states she stays in bed at [facility] but then later states she sits edge of bed sometimes. Patient currently with max A  x 2 for bed mobility, not able to bend at waist and hip flexors appear to be very tight. Patient also with about 10 degrees of flexion in her right knee. This presentation suggests patient does not move very much as baseline. Patient assisted back to bed and repositioned with max A x 2 and bed positioned in chair position. Patient demonstrates max A x  2 with functional mobility skills as noted below. Patient verbalizes no further mobility concerns at this time, is functioning safely with mobility to D/C at this level of care. Updated nurse on results of session, patient left in bed with alarm set, all lines intact, all needs met with call light in reach. Patient on 2 liters of oxygen, oxygen sat 100% and heart rate 102. Patient able to sit edge of bed for about 2 minutes with dependent assist.     Patient history and/or personal factors that may impact the plan of care include limited functional status at baseline.  Based on the physical therapy examination of the noted systems and mobility skills, the patient presentation is stable given the patient demonstrates no significant barriers to meeting therapy goals. Physical Therapy to sign off at this time, please re-consult if patient experiences a decline in functional status. Bed mobility: Max assist and assist of 2  Transfers:  not tested   Gait Assistance: Not tested                   Patient was left in bed and alarm activated at end of session with all needs in reach. Patient does not have the physical skills to return to prior living environment upon D/C from the hospital. Anticipate patient will not benefit from continued skilled physical therapy while in the hospital and can be discharged. The patient's Approx Degree of Impairment: 86.62% has been calculated based on documentation in the [facility] '6 clicks' Inpatient Basic Mobility Short Form. Research supports that patients with this level of impairment may benefit from Long term acute care. RN aware of patient status post session. DISCHARGE RECOMMENDATIONS  PT Discharge Recommendations: 24 hour care/supervision; Long term care    PHYSICAL THERAPY MEDICAL/SOCIAL HISTORY   Problem List  Principal Problem:    Urinary tract infection associated with indwelling urethral catheter, initial encounter   Active Problems:    TYSON (acute kidney injury) ([address]    Sepsis due to urinary tract infection     ABLA (acute blood loss anemia)    Oligouria    Past Medical History  Past Medical History:   Diagnosis Date    Anemia     Arrhythmia     Congestive heart disease ([address]     Deep vein thrombosis (HCC)     Disorder of thyroid     ESBL (extended spectrum beta-lactamase) producing bacteria infection     Essential hypertension     High blood pressure     High cholesterol     Pulmonary embolism (HCC)      Past Surgical History  Past Surgical History:   Procedure Laterality Date    BELOW KNEE LEG AMPUTATION Left      HOME SITUATION  Type of Home: Skilled nursing facility   Home Layout: One level  Stairs to Enter : 0  Railing: No  Stairs to Bedroom: 0  Railing: No  Lives With: Staff 24 hours  Drives: No  Patient Owned Equipment: None  Patient Regularly Uses: None    Prior Level of [place]: Patient reports assist with ADL's prior to admission to the hospital.     SUBJECTIVE  \"Ok\"    PHYSICAL THERAPY EXAMINATION     OBJECTIVE  Precautions: Bed/chair alarm  Fall Risk: High fall risk    WEIGHT BEARING RESTRICTION  Weight Bearing Restriction: None                PAIN ASSESSMENT  Ratin          COGNITION  Overall Cognitive Status:  WFL - within functional limits    RANGE OF MOTION AND STRENGTH ASSESSMENT    Lower extremity ROM is within functional limits  BLE WNL    Lower extremity strength is within functional limits  BLE WNL    BALANCE  Static Sitting: Dependent  Dynamic Sitting: Dependent  Static Standing: Not tested  Dynamic Standing: Not tested    AM-PAC '6-Clicks' INPATIENT SHORT FORM - BASIC MOBILITY  How much difficulty does the patient currently have. .. Patient Difficulty: Turning over in bed (including adjusting bedclothes, sheets and blankets)?: A Lot   Patient Difficulty: Sitting down on and standing up from a chair with arms (e.g., wheelchair, bedside commode, etc.): Unable   Patient Difficulty: Moving from lying on back to sitting on the side of the bed?: A Lot   How much help from another person does the patient currently need. .. Help from Another: Moving to and from a bed to a chair (including a wheelchair)?: Total   Help from Another: Need to walk in hospital room?: Total   Help from Another: Climbing 3-5 steps with a railing?: Total     AM-PAC Score:  Raw Score: 8   Approx Degree of Impairment: 86.62%   Standardized Score (AM-PAC Scale): 28.58   CMS Modifier (G-Code): CM    Exercise/Education Provided:  Bed mobility  Body mechanics    Patient End of Session: In bed;Needs met;Call light within reach;RN aware of session/findings; All patient questions and concerns addressed; Alarm set     Patient Evaluation Complexity Level:  History Low - no personal factors and/or co-morbidities   Examination of body systems Low - addressing 1-2 elements   Clinical Presentation Low - Stable   Clinical Decision Making Low Complexity     Therapeutic Activity: 10 minutes  Therapeutic Exercise: 13 minutes

## 2023-10-06 LAB
ANION GAP SERPL CALC-SCNC: 7 MMOL/L (ref 0–18)
BUN BLD-MCNC: 12 MG/DL (ref 7–18)
BUN/CREAT SERPL: 21.8 (ref 10–20)
CALCIUM BLD-MCNC: 9.4 MG/DL (ref 8.5–10.1)
CHLORIDE SERPL-SCNC: 116 MMOL/L (ref 98–112)
CO2 SERPL-SCNC: 21 MMOL/L (ref 21–32)
CREAT BLD-MCNC: 0.55 MG/DL
DEPRECATED RDW RBC AUTO: 58.2 FL (ref 35.1–46.3)
EGFRCR SERPLBLD CKD-EPI 2021: 104 ML/MIN/1.73M2 (ref 60–?)
ERYTHROCYTE [DISTWIDTH] IN BLOOD BY AUTOMATED COUNT: 17.4 % (ref 11–15)
GLUCOSE BLD-MCNC: 82 MG/DL (ref 70–99)
HCT VFR BLD AUTO: 26.1 %
HGB BLD-MCNC: 8.1 G/DL
MCH RBC QN AUTO: 28.2 PG (ref 26–34)
MCHC RBC AUTO-ENTMCNC: 31 G/DL (ref 31–37)
MCV RBC AUTO: 90.9 FL
OSMOLALITY SERPL CALC.SUM OF ELEC: 297 MOSM/KG (ref 275–295)
PLATELET # BLD AUTO: 368 10(3)UL (ref 150–450)
POTASSIUM SERPL-SCNC: 3.6 MMOL/L (ref 3.5–5.1)
POTASSIUM SERPL-SCNC: 3.6 MMOL/L (ref 3.5–5.1)
RBC # BLD AUTO: 2.87 X10(6)UL
SODIUM SERPL-SCNC: 144 MMOL/L (ref 136–145)
WBC # BLD AUTO: 9 X10(3) UL (ref 4–11)

## 2023-10-06 PROCEDURE — 99232 SBSQ HOSP IP/OBS MODERATE 35: CPT | Performed by: INTERNAL MEDICINE

## 2023-10-06 PROCEDURE — 99233 SBSQ HOSP IP/OBS HIGH 50: CPT | Performed by: INTERNAL MEDICINE

## 2023-10-06 RX ORDER — ASPIRIN 81 MG/1
81 TABLET ORAL DAILY
Status: DISCONTINUED | OUTPATIENT
Start: 2023-10-07 | End: 2023-10-10

## 2023-10-06 RX ORDER — ATORVASTATIN CALCIUM 20 MG/1
20 TABLET, FILM COATED ORAL NIGHTLY
Status: DISCONTINUED | OUTPATIENT
Start: 2023-10-06 | End: 2023-10-10

## 2023-10-06 RX ORDER — TRAMADOL HYDROCHLORIDE 50 MG/1
50 TABLET ORAL EVERY 12 HOURS PRN
Status: DISCONTINUED | OUTPATIENT
Start: 2023-10-06 | End: 2023-10-10

## 2023-10-06 RX ORDER — FUROSEMIDE 10 MG/ML
20 INJECTION INTRAMUSCULAR; INTRAVENOUS DAILY
Status: DISCONTINUED | OUTPATIENT
Start: 2023-10-06 | End: 2023-10-07

## 2023-10-06 RX ORDER — METOPROLOL SUCCINATE 25 MG/1
25 TABLET, EXTENDED RELEASE ORAL
Status: DISCONTINUED | OUTPATIENT
Start: 2023-10-06 | End: 2023-10-10

## 2023-10-06 NOTE — SLP NOTE
SPEECH DAILY NOTE - INPATIENT    ASSESSMENT & PLAN   ASSESSMENT  PPE REQUIRED. THIS THERAPIST WORE GLOVES AND MASK FOR DURATION OF EVALUATION. HANDS WASHED UPON ENTRANCE/EXIT. SLP f/u for ongoing meal assessment per recommendations of soft bite sized/thin liquids per swallow f/u. RN reports pt tolerates diet and medication well with no overt clinical s/s aspiration. Pt denies any swallowing challenges. Pt positioned upright in bed, alert/cooperative. Pt afebrile, tolerating room air with oxygen status 100% prior to the start of oral trials. SLP reviewed aspiration precautions and safe swallowing compensatory strategies with the patient. Safe swallow guidelines remain written on the white board in purple. Patient v/u. Provided 1:1 assistance, pt tolerates soft bite sized consistency and thin liquids via straw with no overt clinical signs/symptoms of aspiration. Pt presented with trials of hard solids. Pt with good oral acceptance and bilabial seal. Pt with intact bite, adequate mastication, and timely A/P transfer. Pharyngeal swallow response appears timely with adequate laryngeal elevation/excursion. No clinical signs of aspiration (e.g., immediate/delayed throat clear, immediate/delayed cough, wet vocal quality, increased O2 effort) observed across all trials. Oxygen status remained stable t/o the entire session. Recommend upgrade to regular consistency. SLP to f/u with meal assessment x1, monitor  CXR, and VFSS if any overt CSA and/or decline in CXR. RN alerted with results and recommendations. MOST RECENT CXR 10/4  CONCLUSION: No acute cardiopulmonary abnormality. Stable cardiomegaly. Diet Recommendations - Solids: Regular  Diet Recommendations - Liquids: Thin Liquids    Compensatory Strategies Recommended: Slow rate;Small bites and sips  Aspiration Precautions: Upright position; Slow rate;Small bites and sips  Medication Administration Recommendations:  (as tolerated)    Patient Experiencing Pain: Yes  Pain Ratin  Pain Location: L leg     Nursing Aware of Pain?: Yes    Discharge Recommendations  Discharge Recommendations/Plan: Undetermined    Treatment Plan  Treatment Plan/Recommendations: Aspiration precautions    Interdisciplinary Communication: Plan posted at bedside            GOALS  Goal #1 The patient will tolerate puree consistency and  mildly thick liquids without overt signs or symptoms of aspiration with 100 % accuracy over 2 session(s). Goal Met 10/5   Goal #2 The patient/family/caregiver will demonstrate understanding and implementation of aspiration precautions and swallow strategies independently over 4 session(s). Education provided regarding aspiration precautions and safe swallow strategies. Pt v/u but would benefit from continued education. Recommendations remain on whiteboard. In Progress   Goal #3 The patient will tolerate trial upgrade of advanced solids consistency and thin liquids without overt signs or symptoms of aspiration with 95 % accuracy over 3 session(s). Tolerated trials of hard solids and thin liquids without overt s/s of aspiration with 100% acc over 3rd session. Rx upgrade to regular/thin liquids. 10/6  In Progress   Goal #4 The patient will utilize compensatory strategies as outlined by  BSSE (clinical evaluation) including Slow rate, Small bites, Small sips, Alternate liquids/solids, No straws, Upright 90 degrees, Eliminate distractions, Feed patient with 1:1 feed assistance 100 % of the time across 2 sessions. SLP led 1:1 feed with slow/small bites/sips while upright in bed. Straws okay. Liquids/solids alternated.    In Progress     FOLLOW UP  Follow Up Needed (Documentation Required): Yes  SLP Follow-up Date: 10/09/23  Number of Visits to Meet Established Goals: 1    Session: 3    If you have any questions, please contact Claritza Fry, SIA Wolfe. Edward Bellamy Pathologist  Phone Number Ext. [phone]

## 2023-10-06 NOTE — PROGRESS NOTES
[facility] Pharmacy Note:  Renal Adjustment for meropenem (MERREM)    Ree Officer is a 66year old patient who has been prescribed ceftazidime/avibactam (AVYCAZ) and meropenem (MERREM) 500 mg every 12 hrs. The estimated creatinine clearance is 96.7 mL/min (based on SCr of 0.55 mg/dL). The dose has been adjusted to meropenem (MERREM) 500 mg every 8 hrs per hospital renal dose adjustment protocol for treatment of UTI. Pharmacy will follow and adjust dose as warranted for additional renal function changes.     Thank you,    Ismael Muñoz PharmD  10/6/2023  11:15 AM

## 2023-10-06 NOTE — PLAN OF CARE
Patient alert and oriented 2-3. Weaned off nasal cannula to RA. PRN tylenol given for pain (see MAR). Awaiting transfer to floor or possible discharge tomorrow. Problem: NEUROLOGICAL - ADULT  Goal: Achieves stable or improved neurological status  Description: INTERVENTIONS  - Assess for and report changes in neurological status  - Initiate measures to prevent increased intracranial pressure  - Maintain blood pressure and fluid volume within ordered parameters to optimize cerebral perfusion and minimize risk of hemorrhage  - Monitor temperature, glucose, and sodium. Initiate appropriate interventions as ordered  Outcome: Progressing     Problem: CARDIOVASCULAR - ADULT  Goal: Maintains optimal cardiac output and hemodynamic stability  Description: INTERVENTIONS:  - Monitor vital signs, rhythm, and trends  - Monitor for bleeding, hypotension and signs of decreased cardiac output  - Evaluate effectiveness of vasoactive medications to optimize hemodynamic stability  - Monitor arterial and/or venous puncture sites for bleeding and/or hematoma  - Assess quality of pulses, skin color and temperature  - Assess for signs of decreased coronary artery perfusion - ex.  Angina  - Evaluate fluid balance, assess for edema, trend weights  Outcome: Progressing

## 2023-10-06 NOTE — PLAN OF CARE
Problem: Patient Centered Care  Goal: Patient preferences are identified and integrated in the patient's plan of care  Description: Interventions:  - What would you like us to know as we care for you? Pain, comfort levels  - Provide timely, complete, and accurate information to patient/family  - Incorporate patient and family knowledge, values, beliefs, and cultural backgrounds into the planning and delivery of care  - Encourage patient/family to participate in care and decision-making at the level they choose  - Honor patient and family perspectives and choices  Outcome: Progressing     Problem: CARDIOVASCULAR - ADULT  Goal: Maintains optimal cardiac output and hemodynamic stability  Description: INTERVENTIONS:  - Monitor vital signs, rhythm, and trends  - Monitor for bleeding, hypotension and signs of decreased cardiac output  - Evaluate effectiveness of vasoactive medications to optimize hemodynamic stability  - Monitor arterial and/or venous puncture sites for bleeding and/or hematoma  - Assess quality of pulses, skin color and temperature  - Assess for signs of decreased coronary artery perfusion - ex. Angina  - Evaluate fluid balance, assess for edema, trend weights  Outcome: Progressing     Problem: NEUROLOGICAL - ADULT  Goal: Achieves stable or improved neurological status  Description: INTERVENTIONS  - Assess for and report changes in neurological status  - Initiate measures to prevent increased intracranial pressure  - Maintain blood pressure and fluid volume within ordered parameters to optimize cerebral perfusion and minimize risk of hemorrhage  - Monitor temperature, glucose, and sodium.  Initiate appropriate interventions as ordered  Outcome: Progressing

## 2023-10-07 LAB
ANION GAP SERPL CALC-SCNC: 4 MMOL/L (ref 0–18)
BUN BLD-MCNC: 12 MG/DL (ref 7–18)
BUN/CREAT SERPL: 23.1 (ref 10–20)
CALCIUM BLD-MCNC: 9.2 MG/DL (ref 8.5–10.1)
CHLORIDE SERPL-SCNC: 116 MMOL/L (ref 98–112)
CO2 SERPL-SCNC: 24 MMOL/L (ref 21–32)
CREAT BLD-MCNC: 0.52 MG/DL
DEPRECATED RDW RBC AUTO: 58.8 FL (ref 35.1–46.3)
EGFRCR SERPLBLD CKD-EPI 2021: 106 ML/MIN/1.73M2 (ref 60–?)
ERYTHROCYTE [DISTWIDTH] IN BLOOD BY AUTOMATED COUNT: 17.5 % (ref 11–15)
GLUCOSE BLD-MCNC: 93 MG/DL (ref 70–99)
HCT VFR BLD AUTO: 25.4 %
HGB BLD-MCNC: 7.7 G/DL
MCH RBC QN AUTO: 28 PG (ref 26–34)
MCHC RBC AUTO-ENTMCNC: 30.3 G/DL (ref 31–37)
MCV RBC AUTO: 92.4 FL
OSMOLALITY SERPL CALC.SUM OF ELEC: 297 MOSM/KG (ref 275–295)
PLATELET # BLD AUTO: 334 10(3)UL (ref 150–450)
POTASSIUM SERPL-SCNC: 3.5 MMOL/L (ref 3.5–5.1)
RBC # BLD AUTO: 2.75 X10(6)UL
SODIUM SERPL-SCNC: 144 MMOL/L (ref 136–145)
WBC # BLD AUTO: 8.9 X10(3) UL (ref 4–11)

## 2023-10-07 PROCEDURE — 99239 HOSP IP/OBS DSCHRG MGMT >30: CPT | Performed by: INTERNAL MEDICINE

## 2023-10-07 RX ORDER — FUROSEMIDE 20 MG/1
20 TABLET ORAL DAILY
Status: DISCONTINUED | OUTPATIENT
Start: 2023-10-07 | End: 2023-10-10

## 2023-10-07 RX ORDER — PANTOPRAZOLE SODIUM 40 MG/1
40 TABLET, DELAYED RELEASE ORAL
Status: DISCONTINUED | OUTPATIENT
Start: 2023-10-08 | End: 2023-10-10

## 2023-10-07 RX ORDER — TRAMADOL HYDROCHLORIDE 50 MG/1
50 TABLET ORAL EVERY 12 HOURS PRN
Qty: 20 TABLET | Refills: 0 | Status: SHIPPED | OUTPATIENT
Start: 2023-10-07

## 2023-10-07 NOTE — CM/SW NOTE
1630:  Per Rima Nunn from E - currently still pending insurance approval at this time. ----------------  0430:  RN informed SW that pt is medically cleared for DC today. Per previous notes, pending insurance approval for pt's return to [facility] of [address] [place] [address] left a vmail for liaisons, Nato Berrios, and sent a message on [employer] for follow up regarding insurance status and pt's possible return today. NADEGE/CM to remain available for support and/or discharge planning.      PLAN: [facility], pending insurance approval    [employer] authorization still pending for [facility] - [id number]  10/07/23, [id number]

## 2023-10-07 NOTE — PLAN OF CARE
Double RN skin check done prior to transfer off Unit. Skin check performed by this RN and Antwon Villa RN. Wounds are as follows: Breakdown of sacrum and bottom, breakdown of right upper leg. Mepilex and cream applied as ordered. Will remain available for any further questions or concerns.

## 2023-10-07 NOTE — PLAN OF CARE
No acute events overnight. All safety measures in place, frequent nursing rounds made. Problem: Patient Centered Care  Goal: Patient preferences are identified and integrated in the patient's plan of care  Description: Interventions:  - What would you like us to know as we care for you?   - Provide timely, complete, and accurate information to patient/family  - Incorporate patient and family knowledge, values, beliefs, and cultural backgrounds into the planning and delivery of care  - Encourage patient/family to participate in care and decision-making at the level they choose  - Honor patient and family perspectives and choices  Outcome: Progressing     Problem: CARDIOVASCULAR - ADULT  Goal: Maintains optimal cardiac output and hemodynamic stability  Description: INTERVENTIONS:  - Monitor vital signs, rhythm, and trends  - Monitor for bleeding, hypotension and signs of decreased cardiac output  - Evaluate effectiveness of vasoactive medications to optimize hemodynamic stability  - Monitor arterial and/or venous puncture sites for bleeding and/or hematoma  - Assess quality of pulses, skin color and temperature  - Assess for signs of decreased coronary artery perfusion - ex. Angina  - Evaluate fluid balance, assess for edema, trend weights  Outcome: Progressing     Problem: NEUROLOGICAL - ADULT  Goal: Achieves stable or improved neurological status  Description: INTERVENTIONS  - Assess for and report changes in neurological status  - Initiate measures to prevent increased intracranial pressure  - Maintain blood pressure and fluid volume within ordered parameters to optimize cerebral perfusion and minimize risk of hemorrhage  - Monitor temperature, glucose, and sodium.  Initiate appropriate interventions as ordered  Outcome: Progressing

## 2023-10-08 PROCEDURE — 99233 SBSQ HOSP IP/OBS HIGH 50: CPT | Performed by: INTERNAL MEDICINE

## 2023-10-08 NOTE — PLAN OF CARE
Patients vitals stable. Remote tele in place. Patient refused multiple turns. Nurse educated patient about turning while laying in bed to prevent future skin breakdown. Patient agreed to future turns. Nurse and patient care tech assisted in bathing, young care, changing bedding and gown. Nurse gently cleansed wounds with soap and water, applied barrier cream, and mepilex. Heel boot in place to right heel. Air mattress placed. Per doctor, held morning metoprolol due to low blood pressure. Bed low, locked, bed alarm in place. Call light in reach. Problem: Patient Centered Care  Goal: Patient preferences are identified and integrated in the patient's plan of care  Description: Interventions:  - What would you like us to know as we care for you?  I want to get back to rehab  - Provide timely, complete, and accurate information to patient/family  - Incorporate patient and family knowledge, values, beliefs, and cultural backgrounds into the planning and delivery of care  - Encourage patient/family to participate in care and decision-making at the level they choose  - Honor patient and family perspectives and choices  Outcome: Progressing     Problem: Patient/Family Goals  Goal: Patient/Family Long Term Goal  Description: Patient's Long Term Goal: to be discharged to nursing home    Interventions:  - Completion of IV antibiotics as per MD order  - See additional Care Plan goals for specific interventions  Outcome: Progressing  Goal: Patient/Family Short Term Goal  Description: Patient's Short Term Goal: to eat more    Interventions:   - Up in chair/chair position during meal times  - Encourage/Offer nutritional shakes as per order  - See additional Care Plan goals for specific interventions  Outcome: Progressing     Problem: CARDIOVASCULAR - ADULT  Goal: Maintains optimal cardiac output and hemodynamic stability  Description: INTERVENTIONS:  - Monitor vital signs, rhythm, and trends  - Monitor for bleeding, hypotension and signs of decreased cardiac output  - Evaluate effectiveness of vasoactive medications to optimize hemodynamic stability  - Monitor arterial and/or venous puncture sites for bleeding and/or hematoma  - Assess quality of pulses, skin color and temperature  - Assess for signs of decreased coronary artery perfusion - ex. Angina  - Evaluate fluid balance, assess for edema, trend weights  Outcome: Progressing  Goal: Absence of cardiac arrhythmias or at baseline  Description: INTERVENTIONS:  - Continuous cardiac monitoring, monitor vital signs, obtain 12 lead EKG if indicated  - Evaluate effectiveness of antiarrhythmic and heart rate control medications as ordered  - Initiate emergency measures for life threatening arrhythmias  - Monitor electrolytes and administer replacement therapy as ordered  Outcome: Progressing     Problem: NEUROLOGICAL - ADULT  Goal: Achieves stable or improved neurological status  Description: INTERVENTIONS  - Assess for and report changes in neurological status  - Initiate measures to prevent increased intracranial pressure  - Maintain blood pressure and fluid volume within ordered parameters to optimize cerebral perfusion and minimize risk of hemorrhage  - Monitor temperature, glucose, and sodium.  Initiate appropriate interventions as ordered  Outcome: Progressing     Problem: SKIN/TISSUE INTEGRITY - ADULT  Goal: Skin integrity remains intact  Description: INTERVENTIONS  - Assess and document risk factors for pressure ulcer development  - Assess and document skin integrity  - Monitor for areas of redness and/or skin breakdown  - Initiate interventions, skin care algorithm/standards of care as needed  Outcome: Progressing  Goal: Incision(s), wounds(s) or drain site(s) healing without S/S of infection  Description: INTERVENTIONS:  - Assess and document risk factors for pressure ulcer development  - Assess and document skin integrity  - Assess and document dressing/incision, wound bed, drain sites and surrounding tissue  - Implement wound care per orders  - Initiate isolation precautions as appropriate  - Initiate Pressure Ulcer prevention bundle as indicated  Outcome: Progressing     Problem: SAFETY ADULT - FALL  Goal: Free from fall injury  Description: INTERVENTIONS:  - Assess pt frequently for physical needs  - Identify cognitive and physical deficits and behaviors that affect risk of falls.   - [place] fall precautions as indicated by assessment.  - Educate pt/family on patient safety including physical limitations  - Instruct pt to call for assistance with activity based on assessment  - Modify environment to reduce risk of injury  - Provide assistive devices as appropriate  - Consider OT/PT consult to assist with strengthening/mobility  - Encourage toileting schedule  Outcome: Progressing     Problem: PAIN - ADULT  Goal: Verbalizes/displays adequate comfort level or patient's stated pain goal  Description: INTERVENTIONS:  - Encourage pt to monitor pain and request assistance  - Assess pain using appropriate pain scale  - Administer analgesics based on type and severity of pain and evaluate response  - Implement non-pharmacological measures as appropriate and evaluate response  - Consider cultural and social influences on pain and pain management  - Manage/alleviate anxiety  - Utilize distraction and/or relaxation techniques  - Monitor for opioid side effects  - Notify MD/LIP if interventions unsuccessful or patient reports new pain  - Anticipate increased pain with activity and pre-medicate as appropriate  Outcome: Progressing     Problem: RISK FOR INFECTION - ADULT  Goal: Absence of fever/infection during anticipated neutropenic period  Description: INTERVENTIONS  - Monitor WBC  - Administer growth factors as ordered  - Implement neutropenic guidelines  Outcome: Progressing     Problem: DISCHARGE PLANNING  Goal: Discharge to home or other facility with appropriate resources  Description: INTERVENTIONS:  - Identify barriers to discharge w/pt and caregiver  - Include patient/family/discharge partner in discharge planning  - Arrange for needed discharge resources and transportation as appropriate  - Identify discharge learning needs (meds, wound care, etc)  - Arrange for interpreters to assist at discharge as needed  - Consider post-discharge preferences of patient/family/discharge partner  - Complete POLST form as appropriate  - Assess patient's ability to be responsible for managing their own health  - Refer to Case Management Department for coordinating discharge planning if the patient needs post-hospital services based on physician/LIP order or complex needs related to functional status, cognitive ability or social support system  Outcome: Progressing     Problem: Altered Communication/Language Barrier  Goal: Patient/Family is able to understand and participate in their care  Description: Interventions:  - Assess communication ability and preferred communication style  - Implement communication aides and strategies  - Use visual cues when possible  - Listen attentively, be patient, do not interrupt  - Minimize distractions  - Allow time for understanding and response  - Establish method for patient to ask for assistance (call light)  - Provide an  as needed  - Communicate barriers and strategies to overcome with those who interact with patient  Outcome: Progressing

## 2023-10-08 NOTE — PLAN OF CARE
Pt a/o, wanted to sleep in this morning. Q2H turn but needs encouragement to let staff do it. Plan to discharge back to [facility] once insurance Arik Marcus is obtained. Problem: CARDIOVASCULAR - ADULT  Goal: Maintains optimal cardiac output and hemodynamic stability  Description: INTERVENTIONS:  - Monitor vital signs, rhythm, and trends  - Monitor for bleeding, hypotension and signs of decreased cardiac output  - Evaluate effectiveness of vasoactive medications to optimize hemodynamic stability  - Monitor arterial and/or venous puncture sites for bleeding and/or hematoma  - Assess quality of pulses, skin color and temperature  - Assess for signs of decreased coronary artery perfusion - ex. Angina  - Evaluate fluid balance, assess for edema, trend weights  Outcome: Progressing  Goal: Absence of cardiac arrhythmias or at baseline  Description: INTERVENTIONS:  - Continuous cardiac monitoring, monitor vital signs, obtain 12 lead EKG if indicated  - Evaluate effectiveness of antiarrhythmic and heart rate control medications as ordered  - Initiate emergency measures for life threatening arrhythmias  - Monitor electrolytes and administer replacement therapy as ordered  Outcome: Progressing     Problem: NEUROLOGICAL - ADULT  Goal: Achieves stable or improved neurological status  Description: INTERVENTIONS  - Assess for and report changes in neurological status  - Initiate measures to prevent increased intracranial pressure  - Maintain blood pressure and fluid volume within ordered parameters to optimize cerebral perfusion and minimize risk of hemorrhage  - Monitor temperature, glucose, and sodium. Initiate appropriate interventions as ordered  Outcome: Progressing     Problem: SAFETY ADULT - FALL  Goal: Free from fall injury  Description: INTERVENTIONS:  - Assess pt frequently for physical needs  - Identify cognitive and physical deficits and behaviors that affect risk of falls.   - [place] fall precautions as indicated by assessment.  - Educate pt/family on patient safety including physical limitations  - Instruct pt to call for assistance with activity based on assessment  - Modify environment to reduce risk of injury  - Provide assistive devices as appropriate  - Consider OT/PT consult to assist with strengthening/mobility  - Encourage toileting schedule  Outcome: Progressing     Problem: PAIN - ADULT  Goal: Verbalizes/displays adequate comfort level or patient's stated pain goal  Description: INTERVENTIONS:  - Encourage pt to monitor pain and request assistance  - Assess pain using appropriate pain scale  - Administer analgesics based on type and severity of pain and evaluate response  - Implement non-pharmacological measures as appropriate and evaluate response  - Consider cultural and social influences on pain and pain management  - Manage/alleviate anxiety  - Utilize distraction and/or relaxation techniques  - Monitor for opioid side effects  - Notify MD/LIP if interventions unsuccessful or patient reports new pain  - Anticipate increased pain with activity and pre-medicate as appropriate  Outcome: Progressing     Problem: RISK FOR INFECTION - ADULT  Goal: Absence of fever/infection during anticipated neutropenic period  Description: INTERVENTIONS  - Monitor WBC  - Administer growth factors as ordered  - Implement neutropenic guidelines  Outcome: Progressing     Problem: DISCHARGE PLANNING  Goal: Discharge to home or other facility with appropriate resources  Description: INTERVENTIONS:  - Identify barriers to discharge w/pt and caregiver  - Include patient/family/discharge partner in discharge planning  - Arrange for needed discharge resources and transportation as appropriate  - Identify discharge learning needs (meds, wound care, etc)  - Arrange for interpreters to assist at discharge as needed  - Consider post-discharge preferences of patient/family/discharge partner  - Complete POLST form as appropriate  - Assess patient's ability to be responsible for managing their own health  - Refer to Case Management Department for coordinating discharge planning if the patient needs post-hospital services based on physician/LIP order or complex needs related to functional status, cognitive ability or social support system  Outcome: Progressing     Problem: Altered Communication/Language Barrier  Goal: Patient/Family is able to understand and participate in their care  Description: Interventions:  - Assess communication ability and preferred communication style  - Implement communication aides and strategies  - Use visual cues when possible  - Listen attentively, be patient, do not interrupt  - Minimize distractions  - Allow time for understanding and response  - Establish method for patient to ask for assistance (call light)  - Provide an  as needed  - Communicate barriers and strategies to overcome with those who interact with patient  Outcome: Progressing     Problem: SKIN/TISSUE INTEGRITY - ADULT  Goal: Skin integrity remains intact  Description: INTERVENTIONS  - Assess and document risk factors for pressure ulcer development  - Assess and document skin integrity  - Monitor for areas of redness and/or skin breakdown  - Initiate interventions, skin care algorithm/standards of care as needed  Outcome: Progressing  Goal: Incision(s), wounds(s) or drain site(s) healing without S/S of infection  Description: INTERVENTIONS:  - Assess and document risk factors for pressure ulcer development  - Assess and document skin integrity  - Assess and document dressing/incision, wound bed, drain sites and surrounding tissue  - Implement wound care per orders  - Initiate isolation precautions as appropriate  - Initiate Pressure Ulcer prevention bundle as indicated  Outcome: Progressing

## 2023-10-08 NOTE — PLAN OF CARE
Problem: Patient/Family Goals  Goal: Patient/Family Long Term Goal  Description: Patient's Long Term Goal: to be discharged to nursing home    Interventions:  - Completion of IV antibiotics as per MD order  - See additional Care Plan goals for specific interventions  Outcome: Progressing  Variance Lack of Interest     Problem: CARDIOVASCULAR - ADULT  Goal: Maintains optimal cardiac output and hemodynamic stability  Description: INTERVENTIONS:  - Monitor vital signs, rhythm, and trends  - Monitor for bleeding, hypotension and signs of decreased cardiac output  - Evaluate effectiveness of vasoactive medications to optimize hemodynamic stability  - Monitor arterial and/or venous puncture sites for bleeding and/or hematoma  - Assess quality of pulses, skin color and temperature  - Assess for signs of decreased coronary artery perfusion - ex.  Angina  - Evaluate fluid balance, assess for edema, trend weights  Outcome: Progressing

## 2023-10-08 NOTE — CM/SW NOTE
Message sent to [facility] liaison requesting update on ins auth, awaiting review and response.      AMANDA Banuelos Sa, [facility]    [id number]

## 2023-10-09 PROCEDURE — 99233 SBSQ HOSP IP/OBS HIGH 50: CPT | Performed by: INTERNAL MEDICINE

## 2023-10-09 NOTE — PLAN OF CARE
Problem: CARDIOVASCULAR - ADULT  Goal: Maintains optimal cardiac output and hemodynamic stability  Description: INTERVENTIONS:  - Monitor vital signs, rhythm, and trends  - Monitor for bleeding, hypotension and signs of decreased cardiac output  - Evaluate effectiveness of vasoactive medications to optimize hemodynamic stability  - Monitor arterial and/or venous puncture sites for bleeding and/or hematoma  - Assess quality of pulses, skin color and temperature  - Assess for signs of decreased coronary artery perfusion - ex. Angina  - Evaluate fluid balance, assess for edema, trend weights  Outcome: Progressing     Problem: NEUROLOGICAL - ADULT  Goal: Achieves stable or improved neurological status  Description: INTERVENTIONS  - Assess for and report changes in neurological status  - Initiate measures to prevent increased intracranial pressure  - Maintain blood pressure and fluid volume within ordered parameters to optimize cerebral perfusion and minimize risk of hemorrhage  - Monitor temperature, glucose, and sodium. Initiate appropriate interventions as ordered  Outcome: Progressing     Problem: SAFETY ADULT - FALL  Goal: Free from fall injury  Description: INTERVENTIONS:  - Assess pt frequently for physical needs  - Identify cognitive and physical deficits and behaviors that affect risk of falls.   - [place] fall precautions as indicated by assessment.  - Educate pt/family on patient safety including physical limitations  - Instruct pt to call for assistance with activity based on assessment  - Modify environment to reduce risk of injury  - Provide assistive devices as appropriate  - Consider OT/PT consult to assist with strengthening/mobility  - Encourage toileting schedule  Outcome: Progressing     Problem: PAIN - ADULT  Goal: Verbalizes/displays adequate comfort level or patient's stated pain goal  Description: INTERVENTIONS:  - Encourage pt to monitor pain and request assistance  - Assess pain using appropriate pain scale  - Administer analgesics based on type and severity of pain and evaluate response  - Implement non-pharmacological measures as appropriate and evaluate response  - Consider cultural and social influences on pain and pain management  - Manage/alleviate anxiety  - Utilize distraction and/or relaxation techniques  - Monitor for opioid side effects  - Notify MD/LIP if interventions unsuccessful or patient reports new pain  - Anticipate increased pain with activity and pre-medicate as appropriate  Outcome: Progressing     Problem: RISK FOR INFECTION - ADULT  Goal: Absence of fever/infection during anticipated neutropenic period  Description: INTERVENTIONS  - Monitor WBC  - Administer growth factors as ordered  - Implement neutropenic guidelines  Outcome: Progressing     Problem: DISCHARGE PLANNING  Goal: Discharge to home or other facility with appropriate resources  Description: INTERVENTIONS:  - Identify barriers to discharge w/pt and caregiver  - Include patient/family/discharge partner in discharge planning  - Arrange for needed discharge resources and transportation as appropriate  - Identify discharge learning needs (meds, wound care, etc)  - Arrange for interpreters to assist at discharge as needed  - Consider post-discharge preferences of patient/family/discharge partner  - Complete POLST form as appropriate  - Assess patient's ability to be responsible for managing their own health  - Refer to Case Management Department for coordinating discharge planning if the patient needs post-hospital services based on physician/LIP order or complex needs related to functional status, cognitive ability or social support system  Outcome: Progressing     Problem: SKIN/TISSUE INTEGRITY - ADULT  Goal: Skin integrity remains intact  Description: INTERVENTIONS  - Assess and document risk factors for pressure ulcer development  - Assess and document skin integrity  - Monitor for areas of redness and/or skin breakdown  - Initiate interventions, skin care algorithm/standards of care as needed  Outcome: Progressing   Pt seen by wound RN at bedside for evaluation as buttocks and upper thigh area open and excoriated due to moisture from incontinence see imaging     Awaiting insurance authorization as pt needs updated wound care note and PT/OT note for peer to peer

## 2023-10-09 NOTE — PROGRESS NOTES
10/09/23 1511   Wound 10/02/23 Friction/Shear [place] Right   Date First Assessed/Time First Assessed: 10/02/23 0430   Present on Original Admission: Yes  Primary Wound Type: Friction/Shear  Location: [place]  Wound Location Orientation: Right   Wound Image    Site Assessment Clean;Fragile;Granulation tissue; Moist;Painful;Red   Closure Not approximated   Drainage Amount None   Treatments Cleansed; Topical (Barrier/Moisturizer/Ointment)   Dressing Open to air   Wound Depth (cm) 0.1 cm   Non-staged Wound Description Partial thickness   Wound Granulation Tissue Red   Wound Odor None   Wound 10/07/23 Buttocks Right;Left   Date First Assessed/Time First Assessed: 10/07/23 0800   Present on Original Admission: Yes  Primary Wound Type: Friction/Shear  Location: Buttocks  Wound Location Orientation: Right;Left   Wound Image    Site Assessment Excoriated;Fragile;Granulation tissue; Moist;Painful;Red;Bleeding   Closure Not approximated   Drainage Amount Scant   Drainage Description Sanguineous   Treatments Cleansed; Topical (Barrier/Moisturizer/Ointment)   Dressing Aquacel Foam   Dressing Changed Changed   Dressing Status Dressing Changed;Removed; Old drainage   Wound Depth (cm) 0.1 cm   Non-staged Wound Description Partial thickness   Jody-wound Assessment Clean;Dry;Fragile   Wound Granulation Tissue Red   Wound Odor None   Wound 10/07/23 Ischium Left   Date First Assessed/Time First Assessed: 10/07/23 0800   Present on Original Admission: No  Primary Wound Type: Friction/Shear  Location: Ischium  Wound Location Orientation: Left   Wound Image   (SEE BUTTOCKS PHOTO)   Site Assessment Clean;Fragile; Excoriated;Moist;Painful;Red   Closure Not approximated   Drainage Amount None   Treatments Cleansed; Topical (Barrier/Moisturizer/Ointment)   Dressing Aquacel Foam   Dressing Changed Changed   Dressing Status Dressing Changed;Removed   Wound Depth (cm) 0.1 cm   Non-staged Wound Description Partial thickness   Jody-wound Assessment Clean;Dry;Fragile   Wound Granulation Tissue Red   Wound Odor None   Wound Follow Up   Follow up needed Yes   Comfort and Environment Interventions   Specialty Bed/Mattress   (ISOTOUR GEL W AIR PUMP)     Pt seen with bedside RN. The friction and shear is largely unchanged from last assessment. Recommend to continue q 2 hour turning, application of moisture barrier/zinc to the excoriated friction and shear areas with prompt incontinence care. Indwelling young catheter is patent. Pillow placed under the right hip, right heel boot in place, left stump is intact. Pt is on a specialty bed as above.

## 2023-10-09 NOTE — PLAN OF CARE
Problem: Patient Centered Care  Goal: Patient preferences are identified and integrated in the patient's plan of care  Description: Interventions:  - What would you like us to know as we care for you? Pt from [facility]  - Provide timely, complete, and accurate information to patient/family  - Incorporate patient and family knowledge, values, beliefs, and cultural backgrounds into the planning and delivery of care  - Encourage patient/family to participate in care and decision-making at the level they choose  - Honor patient and family perspectives and choices  Outcome: Progressing     Problem: Patient/Family Goals  Goal: Patient/Family Long Term Goal  Description: Patient's Long Term Goal: to be discharged to nursing home    Interventions:  - Completion of IV antibiotics as per MD order  - See additional Care Plan goals for specific interventions  Outcome: Progressing  Goal: Patient/Family Short Term Goal  Description: Patient's Short Term Goal: to eat more    Interventions:   - Up in chair/chair position during meal times  - Encourage/Offer nutritional shakes as per order  - See additional Care Plan goals for specific interventions  Outcome: Progressing     Problem: CARDIOVASCULAR - ADULT  Goal: Maintains optimal cardiac output and hemodynamic stability  Description: INTERVENTIONS:  - Monitor vital signs, rhythm, and trends  - Monitor for bleeding, hypotension and signs of decreased cardiac output  - Evaluate effectiveness of vasoactive medications to optimize hemodynamic stability  - Monitor arterial and/or venous puncture sites for bleeding and/or hematoma  - Assess quality of pulses, skin color and temperature  - Assess for signs of decreased coronary artery perfusion - ex.  Angina  - Evaluate fluid balance, assess for edema, trend weights  Outcome: Progressing  Goal: Absence of cardiac arrhythmias or at baseline  Description: INTERVENTIONS:  - Continuous cardiac monitoring, monitor vital signs, obtain 12 lead EKG if indicated  - Evaluate effectiveness of antiarrhythmic and heart rate control medications as ordered  - Initiate emergency measures for life threatening arrhythmias  - Monitor electrolytes and administer replacement therapy as ordered  Outcome: Progressing     Problem: NEUROLOGICAL - ADULT  Goal: Achieves stable or improved neurological status  Description: INTERVENTIONS  - Assess for and report changes in neurological status  - Initiate measures to prevent increased intracranial pressure  - Maintain blood pressure and fluid volume within ordered parameters to optimize cerebral perfusion and minimize risk of hemorrhage  - Monitor temperature, glucose, and sodium. Initiate appropriate interventions as ordered  Outcome: Progressing     Problem: SKIN/TISSUE INTEGRITY - ADULT  Goal: Skin integrity remains intact  Description: INTERVENTIONS  - Assess and document risk factors for pressure ulcer development  - Assess and document skin integrity  - Monitor for areas of redness and/or skin breakdown  - Initiate interventions, skin care algorithm/standards of care as needed  Outcome: Progressing  Goal: Incision(s), wounds(s) or drain site(s) healing without S/S of infection  Description: INTERVENTIONS:  - Assess and document risk factors for pressure ulcer development  - Assess and document skin integrity  - Assess and document dressing/incision, wound bed, drain sites and surrounding tissue  - Implement wound care per orders  - Initiate isolation precautions as appropriate  - Initiate Pressure Ulcer prevention bundle as indicated  Outcome: Progressing     Problem: SAFETY ADULT - FALL  Goal: Free from fall injury  Description: INTERVENTIONS:  - Assess pt frequently for physical needs  - Identify cognitive and physical deficits and behaviors that affect risk of falls.   - [place] fall precautions as indicated by assessment.  - Educate pt/family on patient safety including physical limitations  - Instruct pt to call for assistance with activity based on assessment  - Modify environment to reduce risk of injury  - Provide assistive devices as appropriate  - Consider OT/PT consult to assist with strengthening/mobility  - Encourage toileting schedule  Outcome: Progressing     Problem: PAIN - ADULT  Goal: Verbalizes/displays adequate comfort level or patient's stated pain goal  Description: INTERVENTIONS:  - Encourage pt to monitor pain and request assistance  - Assess pain using appropriate pain scale  - Administer analgesics based on type and severity of pain and evaluate response  - Implement non-pharmacological measures as appropriate and evaluate response  - Consider cultural and social influences on pain and pain management  - Manage/alleviate anxiety  - Utilize distraction and/or relaxation techniques  - Monitor for opioid side effects  - Notify MD/LIP if interventions unsuccessful or patient reports new pain  - Anticipate increased pain with activity and pre-medicate as appropriate  Outcome: Progressing     Problem: RISK FOR INFECTION - ADULT  Goal: Absence of fever/infection during anticipated neutropenic period  Description: INTERVENTIONS  - Monitor WBC  - Administer growth factors as ordered  - Implement neutropenic guidelines  Outcome: Progressing     Problem: DISCHARGE PLANNING  Goal: Discharge to home or other facility with appropriate resources  Description: INTERVENTIONS:  - Identify barriers to discharge w/pt and caregiver  - Include patient/family/discharge partner in discharge planning  - Arrange for needed discharge resources and transportation as appropriate  - Identify discharge learning needs (meds, wound care, etc)  - Arrange for interpreters to assist at discharge as needed  - Consider post-discharge preferences of patient/family/discharge partner  - Complete POLST form as appropriate  - Assess patient's ability to be responsible for managing their own health  - Refer to Case Management Department for coordinating discharge planning if the patient needs post-hospital services based on physician/LIP order or complex needs related to functional status, cognitive ability or social support system  Outcome: Progressing     Problem: Altered Communication/Language Barrier  Goal: Patient/Family is able to understand and participate in their care  Description: Interventions:  - Assess communication ability and preferred communication style  - Implement communication aides and strategies  - Use visual cues when possible  - Listen attentively, be patient, do not interrupt  - Minimize distractions  - Allow time for understanding and response  - Establish method for patient to ask for assistance (call light)  - Provide an  as needed  - Communicate barriers and strategies to overcome with those who interact with patient  Outcome: Progressing

## 2023-10-09 NOTE — PHYSICAL THERAPY NOTE
PHYSICAL THERAPY RE-EVALUATION - INPATIENT     Room Number: 564/564-A  Evaluation Date: 10/9/2023  Type of Evaluation: Re-evaluation   Physician Order: PT Eval and Treat    Presenting Problem: UTI  Co-Morbidities : L BKA  Reason for Therapy: Mobility Dysfunction and Discharge Planning    PHYSICAL THERAPY ASSESSMENT     Patient is a 66year old female admitted 10/1/2023 for UTI. Patient's current functional deficits include impaired tolerance toward activity due to fatigue and pain, impaired bed mobility, transfers, balance and strength, which are below the patient's pre-admission status. Patient received supine in bed. RN approved activity. Coordinated session with OT. Therapist educated pt on POC and physiological benefits of mobilization. Patient agreeable to participate with encouragement. Primary complaint is R posterior thigh pain which she does not quantify. Noted dysmetric movements of extremities, however, able to demonstrate intermittent smooth, controlled movements on her own volition. Bed mobility: Max A x 2 supine<>sit. Assist with LE's and trunk. Tolerated sitting EOB for 8 minutes requiring initial Max A x 2 for balance improving to Min A x 1 as time in sitting increased. Initial extension posture requiring max cues to facilitate forward flexion at hip/trunk. Assist with positioning of RUE for support on bedrail. Participated in anterior<>posterior weight shifts x 5 and x 3 trials of forward trunk flexion and able to maintain x 10 seconds each trial.     Patient in bed at end of session with needs in reach and alarm activated. The patient's Approx Degree of Impairment: 86.62% has been calculated based on documentation in the [facility] '6 clicks' Inpatient Basic Mobility Short Form. Research supports that patients with this level of impairment may benefit from LTAC, however, pt expressed motivation to participate and improve functional mobility.  Would benefit from sub-acute rehab to address current deficits. Patient will benefit from continued IP PT services to address these deficits in preparation for discharge. DISCHARGE RECOMMENDATIONS  PT Discharge Recommendations: Sub-acute rehabilitation    PLAN  PT Treatment Plan: Body mechanics; Bed mobility; Endurance; Energy conservation;Patient education; Family education;Strengthening;Range of motion;Transfer training;Balance training  Rehab Potential : Fair  Frequency (Obs): 3-5x/week       PHYSICAL THERAPY MEDICAL/SOCIAL HISTORY     Problem List  Principal Problem:    Urinary tract infection associated with indwelling urethral catheter, initial encounter   Active Problems:    TYSON (acute kidney injury) ([facility] [address]    Sepsis due to urinary tract infection     ABLA (acute blood loss anemia)    Oligouria      HOME SITUATION  Home Situation  Type of Home: [address] [place] [address] [place]: One level  Stairs to Enter : 0  Railing: No  Stairs to Bedroom: 0  Railing: No  Lives With: Staff 24 hours  Drives: No  Patient Owned Equipment: None  Patient Regularly Uses: None     Prior Level of [place]: Patient admitted from [facility] 12- reports assist with ADL's and IADL's from staff    SUBJECTIVE  \"It gets harder to move when you're laying in bed all the time\"     PHYSICAL THERAPY EXAMINATION     OBJECTIVE  Precautions: Bed/chair alarm  Fall Risk: High fall risk      PAIN ASSESSMENT  Rating: Unable to rate  Location: Posterior R thigh  Management Techniques: Activity promotion; Body mechanics;Repositioning    COGNITION  Overall Cognitive Status:  Impaired  Following Commands:  follows multistep commands with increased time    RANGE OF MOTION AND STRENGTH ASSESSMENT  Upper extremity ROM and strength are within functional limits   Lower extremity ROM is within functional limits   Lower extremity strength is     BALANCE  Static Sitting: Poor +  Dynamic Sitting: Dependent  Static Standing: Not tested  Dynamic Standing: Not tested    ACTIVITY TOLERANCE  Pulse: (!) 122  Heart Rate Source: Monitor                   O2 WALK  Oxygen Therapy  SPO2% on Room Air at Rest: 98    AM-PAC '6-Clicks' [address]  How much difficulty does the patient currently have. .. Patient Difficulty: Turning over in bed (including adjusting bedclothes, sheets and blankets)?: A Lot   Patient Difficulty: Sitting down on and standing up from a chair with arms (e.g., wheelchair, bedside commode, etc.): Unable   Patient Difficulty: Moving from lying on back to sitting on the side of the bed?: A Lot   How much help from another person does the patient currently need. .. Help from Another: Moving to and from a bed to a chair (including a wheelchair)?: Total   Help from Another: Need to walk in hospital room?: Total   Help from Another: Climbing 3-5 steps with a railing?: Total     AM-PAC Score:  Raw Score: 8   Approx Degree of Impairment: 86.62%   Standardized Score (AM-PAC Scale): 28.58   CMS Modifier (G-Code): CM    FUNCTIONAL ABILITY STATUS  Functional Mobility/Gait Assessment  Gait Assistance: Not tested    Exercise/Education Provided:  Bed mobility  Body mechanics  Energy conservation  Functional activity tolerated  Gait training  Posture  ROM    Patient End of Session: In bed;Call light within reach; Needs met;RN aware of session/findings; Alarm set    CURRENT GOALS    Goals to be met by: 10/23/23  Patient Goal Patient's self-stated goal is: go to rehab   Goal #1 Patient is able to demonstrate supine - sit EOB @ level: moderate assistance     Goal #1   Current Status    Goal #2 Patient will tolerate sitting EOB for 10 minutes requiring CG to maintain sitting balance   Goal #2  Current Status    Goal #3 Patient is able to demonstrate transfers EOB to/from Chair/Wheelchair at assistance level: moderate assistance with gait belt   Goal #3   Current Status    Goal #4 Patient to demonstrate independence with home activity/exercise instructions provided to patient in preparation for discharge. Goal #4   Current Status      Patient Evaluation Complexity Level:  History Moderate - 1 or 2 personal factors and/or co-morbidities   Examination of body systems Moderate - addressing a total of 3 or more elements   Clinical Presentation Moderate - Evolving   Clinical Decision Making Moderate Complexity     Therapeutic Activity: 15 minutes

## 2023-10-09 NOTE — CM/SW NOTE
NADEGE followed up with insurance auth status via aidin to E. Insurance Ashlee Jolley is still pending at this time. 1110am-Nadege was informed by  [facility] that insurance has the  intent to deny -  there is an option for a P2P review - the phone # to call and schedule is [phone]. Deadline to complete P2P is by 11:00am tomorrow 10/9. Ref # [id number]. Nadege reached out to therapy [id number] to see if pt can be placed on the list for today. Pt's Name: Lora Lambert  Pt's  1960  Pt's Subsriber ID: [id number]    419pm- NADEGE called [phone], provided information necessary,  left call back number, contact number for MD, and her requested availability. NADEGE left call back number for confirmation. RN called as well. Plan: Pending insurance auth, DC to BTE, *auth, *p2p outcome    SW/CM to remain available for support and/or discharge planning.      Kei Abebe, MSW, LSW   x [phone]

## 2023-10-09 NOTE — SLP NOTE
SPEECH DAILY NOTE - INPATIENT    ASSESSMENT & PLAN   ASSESSMENT  PPE REQUIRED. THIS THERAPIST WORE GLOVES, DROPLET MASK, AND GOGGLES FOR DURATION OF EVALUATION. HANDS WASHED UPON ENTRANCE/EXIT. SLP f/u for ongoing dysphagia tx/meal assessment per recommendations of regular/thin per upgrade. RN reports pt tolerates diet and medication well with no overt clinical s/s aspiration. Pt denies any swallowing challenges. Pt positioned upright in bed. Pt afebrile, tolerating room air with oxygen status 99 prior to the start of oral trials. SLP reviewed aspiration precautions and safe swallowing compensatory strategies with the patient. Safe swallow guidelines remain written on the white board in purple. Diet recommendations remain on the whiteboard in the room. Patient v/u. Provided no assistance, pt tolerates regular and thin liquids via open cup with no overt clinical signs/symptoms of aspiration. Oxygen status remained >98 t/o the entire session. Oral/buccal cavities clear of residue following all trials. PLAN: SLP to sign off at this time secondary to pt tolerating least restrictive diet with no CSA. Diet Recommendations - Solids: Regular  Diet Recommendations - Liquids: Thin Liquids    Compensatory Strategies Recommended: Slow rate;Small bites and sips  Aspiration Precautions: Upright position; Slow rate;Small bites and sips  Medication Administration Recommendations:  (as tolerated)    Patient Experiencing Pain: Yes  Pain Ratin  Pain Location: foot     Nursing Aware of Pain?: Yes    Discharge Recommendations  Discharge Recommendations/Plan: Undetermined    Treatment Plan  Treatment Plan/Recommendations: No further inpatient SLP service warranted    Interdisciplinary Communication: Discussed with RN  Recommendations posted at bedside            GOALS  Goal #1 The patient will tolerate puree consistency and  mildly thick liquids without overt signs or symptoms of aspiration with 100 % accuracy over 2 session(s). Goal Met 10/5   Goal #2 The patient/family/caregiver will demonstrate understanding and implementation of aspiration precautions and swallow strategies independently over 4 session(s). Education provided regarding aspiration precautions and safe swallow strategies. Pt v/u and demonstrated use of strategies. Recommendations remain on whiteboard. Goal met   Goal #3 The patient will tolerate trial upgrade of advanced solids consistency and thin liquids without overt signs or symptoms of aspiration with 95 % accuracy over 3 session(s). Tolerated upgraded diet of regular/thin consistencies. Goal met   Goal #4 The patient will utilize compensatory strategies as outlined by  BSSE (clinical evaluation) including Slow rate, Small bites, Small sips, Alternate liquids/solids, No straws, Upright 90 degrees, Eliminate distractions, Feed patient with 1:1 feed assistance 100 % of the time across 2 sessions. Pt self fed with slow/small bites/sips while upright in bed. Liquids/solids alternated. Distractions eliminated Goal met     FOLLOW UP  Follow Up Needed (Documentation Required): No  SLP Follow-up Date: 10/09/23  Number of Visits to Meet Established Goals: 0    Session: 4    If you have any questions, please contact Ernesto Mix, SIA [place]  Speech Language Pathologist  Phone Number Ext. [phone]

## 2023-10-10 VITALS
RESPIRATION RATE: 16 BRPM | OXYGEN SATURATION: 99 % | BODY MASS INDEX: 21.27 KG/M2 | HEIGHT: 65 IN | HEART RATE: 81 BPM | DIASTOLIC BLOOD PRESSURE: 66 MMHG | WEIGHT: 127.69 LBS | TEMPERATURE: 99 F | SYSTOLIC BLOOD PRESSURE: 119 MMHG

## 2023-10-10 PROCEDURE — 99239 HOSP IP/OBS DSCHRG MGMT >30: CPT | Performed by: INTERNAL MEDICINE

## 2023-10-10 RX ORDER — FUROSEMIDE 20 MG/1
20 TABLET ORAL DAILY
Qty: 30 TABLET | Refills: 0 | Status: SHIPPED | OUTPATIENT
Start: 2023-10-10 | End: 2023-11-09

## 2023-10-10 NOTE — PROGRESS NOTES
Patient okay to be discharged per MD order, report to be called by primary RN, remote tele removed, pt refusing flu vaccine, all paperwork filled out and printed to be picked up by [facility] at time of transport. Pt to be picked up by [facility] this afternoon with all belongings, pt aware of discharge plan.

## 2023-10-10 NOTE — PROGRESS NOTES
10/09/23 1926   Clinical Encounter Type   Visited With Patient   Routine Visit Introduction   Trigger for Consult   Trigger for Spiritual Care Consult No       Pt declined visit    [employer], [address]

## 2023-10-10 NOTE — PLAN OF CARE
No acute changes. Patient appropriate for discharge per MD. Patient and son aware of plan for discharge back to [facility]. Report given to [place], RN. Belongings and paperwork sent with patient.

## 2023-10-10 NOTE — CM/SW NOTE
NADEGE received voice mail from [employer]'s p2p scheduled for 830am central time, 930am eastern time with Dr. Trang Dan today. NADEGE notified MD via epic secure chat.        10/10/23 3957   Discharge disposition   Expected discharge disposition subacute   Post Acute Care Provider [facility]   Discharge transportation [facility]   [facility] to [facility]. MD updated DC order. Pt requires an ambulance according to the RN due to being a max assist due to L CHIKIA, [employer]. NADEGE called and ordered an Ambulance from [facility] to transport pt to [facility]  at [facility]. PCS flow sheet completed. RN to attached to AVS and print out. RN to call report to [facility] at [phone].    1010am- NADEGE scheduled [facility] for 2pm. NADEGE spoke to [place] ashly. Son Luzmaria Calle notified. Plan:   DC to [facility], *[facility] on will call. NADEGE/MANNY to remain available for support and/or discharge planning.      Charly Joshi, MSW, LSW   x [phone]

## 2023-10-10 NOTE — PLAN OF CARE
Patient vital signs stable. Pain treated with non-pharmacologic hot pack and repositioning. No acute changes. Problem: Patient Centered Care  Goal: Patient preferences are identified and integrated in the patient's plan of care  Description: Interventions:  - What would you like us to know as we care for you? From [facility]  - Provide timely, complete, and accurate information to patient/family  - Incorporate patient and family knowledge, values, beliefs, and cultural backgrounds into the planning and delivery of care  - Encourage patient/family to participate in care and decision-making at the level they choose  - Honor patient and family perspectives and choices  Outcome: Progressing     Problem: Patient/Family Goals  Goal: Patient/Family Long Term Goal  Description: Patient's Long Term Goal: to be discharged to nursing home    Interventions:  - Completion of IV antibiotics as per MD order  - See additional Care Plan goals for specific interventions  Outcome: Progressing  Goal: Patient/Family Short Term Goal  Description: Patient's Short Term Goal: to eat more    Interventions:   - Up in chair/chair position during meal times  - Encourage/Offer nutritional shakes as per order  - See additional Care Plan goals for specific interventions  Outcome: Progressing     Problem: CARDIOVASCULAR - ADULT  Goal: Maintains optimal cardiac output and hemodynamic stability  Description: INTERVENTIONS:  - Monitor vital signs, rhythm, and trends  - Monitor for bleeding, hypotension and signs of decreased cardiac output  - Evaluate effectiveness of vasoactive medications to optimize hemodynamic stability  - Monitor arterial and/or venous puncture sites for bleeding and/or hematoma  - Assess quality of pulses, skin color and temperature  - Assess for signs of decreased coronary artery perfusion - ex.  Angina  - Evaluate fluid balance, assess for edema, trend weights  Outcome: Progressing  Goal: Absence of cardiac arrhythmias or at baseline  Description: INTERVENTIONS:  - Continuous cardiac monitoring, monitor vital signs, obtain 12 lead EKG if indicated  - Evaluate effectiveness of antiarrhythmic and heart rate control medications as ordered  - Initiate emergency measures for life threatening arrhythmias  - Monitor electrolytes and administer replacement therapy as ordered  Outcome: Progressing     Problem: NEUROLOGICAL - ADULT  Goal: Achieves stable or improved neurological status  Description: INTERVENTIONS  - Assess for and report changes in neurological status  - Initiate measures to prevent increased intracranial pressure  - Maintain blood pressure and fluid volume within ordered parameters to optimize cerebral perfusion and minimize risk of hemorrhage  - Monitor temperature, glucose, and sodium. Initiate appropriate interventions as ordered  Outcome: Progressing     Problem: SAFETY ADULT - FALL  Goal: Free from fall injury  Description: INTERVENTIONS:  - Assess pt frequently for physical needs  - Identify cognitive and physical deficits and behaviors that affect risk of falls.   - [place] fall precautions as indicated by assessment.  - Educate pt/family on patient safety including physical limitations  - Instruct pt to call for assistance with activity based on assessment  - Modify environment to reduce risk of injury  - Provide assistive devices as appropriate  - Consider OT/PT consult to assist with strengthening/mobility  - Encourage toileting schedule  Outcome: Progressing     Problem: PAIN - ADULT  Goal: Verbalizes/displays adequate comfort level or patient's stated pain goal  Description: INTERVENTIONS:  - Encourage pt to monitor pain and request assistance  - Assess pain using appropriate pain scale  - Administer analgesics based on type and severity of pain and evaluate response  - Implement non-pharmacological measures as appropriate and evaluate response  - Consider cultural and social influences on pain and pain management  - Manage/alleviate anxiety  - Utilize distraction and/or relaxation techniques  - Monitor for opioid side effects  - Notify MD/LIP if interventions unsuccessful or patient reports new pain  - Anticipate increased pain with activity and pre-medicate as appropriate  Outcome: Progressing     Problem: RISK FOR INFECTION - ADULT  Goal: Absence of fever/infection during anticipated neutropenic period  Description: INTERVENTIONS  - Monitor WBC  - Administer growth factors as ordered  - Implement neutropenic guidelines  Outcome: Progressing     Problem: DISCHARGE PLANNING  Goal: Discharge to home or other facility with appropriate resources  Description: INTERVENTIONS:  - Identify barriers to discharge w/pt and caregiver  - Include patient/family/discharge partner in discharge planning  - Arrange for needed discharge resources and transportation as appropriate  - Identify discharge learning needs (meds, wound care, etc)  - Arrange for interpreters to assist at discharge as needed  - Consider post-discharge preferences of patient/family/discharge partner  - Complete POLST form as appropriate  - Assess patient's ability to be responsible for managing their own health  - Refer to Case Management Department for coordinating discharge planning if the patient needs post-hospital services based on physician/LIP order or complex needs related to functional status, cognitive ability or social support system  Outcome: Progressing     Problem: Altered Communication/Language Barrier  Goal: Patient/Family is able to understand and participate in their care  Description: Interventions:  - Assess communication ability and preferred communication style  - Implement communication aides and strategies  - Use visual cues when possible  - Listen attentively, be patient, do not interrupt  - Minimize distractions  - Allow time for understanding and response  - Establish method for patient to ask for assistance (call light)  - Provide an  as needed  - Communicate barriers and strategies to overcome with those who interact with patient  Outcome: Progressing     Problem: SKIN/TISSUE INTEGRITY - ADULT  Goal: Skin integrity remains intact  Description: INTERVENTIONS  - Assess and document risk factors for pressure ulcer development  - Assess and document skin integrity  - Monitor for areas of redness and/or skin breakdown  - Initiate interventions, skin care algorithm/standards of care as needed  Outcome: Progressing  Goal: Incision(s), wounds(s) or drain site(s) healing without S/S of infection  Description: INTERVENTIONS:  - Assess and document risk factors for pressure ulcer development  - Assess and document skin integrity  - Assess and document dressing/incision, wound bed, drain sites and surrounding tissue  - Implement wound care per orders  - Initiate isolation precautions as appropriate  - Initiate Pressure Ulcer prevention bundle as indicated  Outcome: Progressing

## 2023-10-17 ENCOUNTER — HOSPITAL ENCOUNTER (EMERGENCY)
Facility: HOSPITAL | Age: 63
Discharge: HOME | End: 2023-10-17
Attending: EMERGENCY MEDICINE
Payer: MEDICARE

## 2023-10-17 ENCOUNTER — APPOINTMENT (OUTPATIENT)
Dept: GENERAL RADIOLOGY | Facility: HOSPITAL | Age: 63
End: 2023-10-17
Attending: EMERGENCY MEDICINE
Payer: MEDICARE

## 2023-10-17 VITALS
HEART RATE: 88 BPM | BODY MASS INDEX: 26.29 KG/M2 | RESPIRATION RATE: 11 BRPM | TEMPERATURE: 99 F | WEIGHT: 154 LBS | SYSTOLIC BLOOD PRESSURE: 100 MMHG | DIASTOLIC BLOOD PRESSURE: 64 MMHG | HEIGHT: 64 IN | OXYGEN SATURATION: 88 %

## 2023-10-17 DIAGNOSIS — E05.90 HYPERTHYROIDISM: ICD-10-CM

## 2023-10-17 DIAGNOSIS — E87.6 HYPOKALEMIA: Primary | ICD-10-CM

## 2023-10-17 LAB
ALBUMIN SERPL-MCNC: 1.9 G/DL (ref 3.4–5)
ALBUMIN/GLOB SERPL: 0.4 {RATIO} (ref 1–2)
ALP LIVER SERPL-CCNC: 112 U/L
ALT SERPL-CCNC: 8 U/L
ANION GAP SERPL CALC-SCNC: 6 MMOL/L (ref 0–18)
AST SERPL-CCNC: 18 U/L (ref 15–37)
ATRIAL RATE: 100 BPM
BASOPHILS # BLD AUTO: 0.01 X10(3) UL (ref 0–0.2)
BASOPHILS NFR BLD AUTO: 0.1 %
BILIRUB SERPL-MCNC: 0.3 MG/DL (ref 0.1–2)
BILIRUB UR QL CFM: NEGATIVE
BUN BLD-MCNC: 8 MG/DL (ref 7–18)
BUN/CREAT SERPL: 15.1 (ref 10–20)
CALCIUM BLD-MCNC: 9.2 MG/DL (ref 8.5–10.1)
CHLORIDE SERPL-SCNC: 104 MMOL/L (ref 98–112)
CO2 SERPL-SCNC: 33 MMOL/L (ref 21–32)
COLOR UR: YELLOW
CREAT BLD-MCNC: 0.53 MG/DL
DEPRECATED RDW RBC AUTO: 56.9 FL (ref 35.1–46.3)
EGFRCR SERPLBLD CKD-EPI 2021: 105 ML/MIN/1.73M2 (ref 60–?)
EOSINOPHIL # BLD AUTO: 0.16 X10(3) UL (ref 0–0.7)
EOSINOPHIL NFR BLD AUTO: 1.8 %
ERYTHROCYTE [DISTWIDTH] IN BLOOD BY AUTOMATED COUNT: 17.5 % (ref 11–15)
GLOBULIN PLAS-MCNC: 4.4 G/DL (ref 2.8–4.4)
GLUCOSE BLD-MCNC: 129 MG/DL (ref 70–99)
GLUCOSE UR-MCNC: NORMAL MG/DL
HCT VFR BLD AUTO: 29 %
HGB BLD-MCNC: 9.2 G/DL
HYALINE CASTS #/AREA URNS AUTO: PRESENT /LPF
IMM GRANULOCYTES # BLD AUTO: 0.02 X10(3) UL (ref 0–1)
IMM GRANULOCYTES NFR BLD: 0.2 %
KETONES UR-MCNC: 20 MG/DL
LEUKOCYTE ESTERASE UR QL STRIP.AUTO: 500
LYMPHOCYTES # BLD AUTO: 1.77 X10(3) UL (ref 1–4)
LYMPHOCYTES NFR BLD AUTO: 20 %
MCH RBC QN AUTO: 28.3 PG (ref 26–34)
MCHC RBC AUTO-ENTMCNC: 31.7 G/DL (ref 31–37)
MCV RBC AUTO: 89.2 FL
MONOCYTES # BLD AUTO: 0.67 X10(3) UL (ref 0.1–1)
MONOCYTES NFR BLD AUTO: 7.6 %
NEUTROPHILS # BLD AUTO: 6.23 X10 (3) UL (ref 1.5–7.7)
NEUTROPHILS # BLD AUTO: 6.23 X10(3) UL (ref 1.5–7.7)
NEUTROPHILS NFR BLD AUTO: 70.3 %
NITRITE UR QL STRIP.AUTO: NEGATIVE
OSMOLALITY SERPL CALC.SUM OF ELEC: 296 MOSM/KG (ref 275–295)
P AXIS: 4 DEGREES
P-R INTERVAL: 118 MS
PH UR: 5.5 [PH] (ref 5–8)
PLATELET # BLD AUTO: 481 10(3)UL (ref 150–450)
POTASSIUM SERPL-SCNC: 3.2 MMOL/L (ref 3.5–5.1)
PROT SERPL-MCNC: 6.3 G/DL (ref 6.4–8.2)
PROT UR-MCNC: 70 MG/DL
Q-T INTERVAL: 342 MS
QRS DURATION: 84 MS
QTC CALCULATION (BEZET): 441 MS
R AXIS: 58 DEGREES
RBC # BLD AUTO: 3.25 X10(6)UL
RBC #/AREA URNS AUTO: >10 /HPF
SODIUM SERPL-SCNC: 143 MMOL/L (ref 136–145)
SP GR UR STRIP: 1.02 (ref 1–1.03)
T AXIS: 124 DEGREES
T4 FREE SERPL-MCNC: 1.9 NG/DL (ref 0.8–1.7)
TROPONIN I HIGH SENSITIVITY: 11 NG/L
TSI SER-ACNC: 0.04 MIU/ML (ref 0.36–3.74)
UROBILINOGEN UR STRIP-ACNC: 2
VENTRICULAR RATE: 100 BPM
WBC # BLD AUTO: 8.9 X10(3) UL (ref 4–11)
WBC #/AREA URNS AUTO: >50 /HPF
WBC CLUMPS UR QL AUTO: PRESENT /HPF
YEAST UR QL: PRESENT /HPF

## 2023-10-17 PROCEDURE — 84484 ASSAY OF TROPONIN QUANT: CPT | Performed by: EMERGENCY MEDICINE

## 2023-10-17 PROCEDURE — 87186 SC STD MICRODIL/AGAR DIL: CPT | Performed by: EMERGENCY MEDICINE

## 2023-10-17 PROCEDURE — 71045 X-RAY EXAM CHEST 1 VIEW: CPT | Performed by: EMERGENCY MEDICINE

## 2023-10-17 PROCEDURE — 93010 ELECTROCARDIOGRAM REPORT: CPT

## 2023-10-17 PROCEDURE — 93005 ELECTROCARDIOGRAM TRACING: CPT

## 2023-10-17 PROCEDURE — 84443 ASSAY THYROID STIM HORMONE: CPT | Performed by: EMERGENCY MEDICINE

## 2023-10-17 PROCEDURE — 96361 HYDRATE IV INFUSION ADD-ON: CPT

## 2023-10-17 PROCEDURE — 84439 ASSAY OF FREE THYROXINE: CPT | Performed by: EMERGENCY MEDICINE

## 2023-10-17 PROCEDURE — 87088 URINE BACTERIA CULTURE: CPT | Performed by: EMERGENCY MEDICINE

## 2023-10-17 PROCEDURE — 85025 COMPLETE CBC W/AUTO DIFF WBC: CPT | Performed by: EMERGENCY MEDICINE

## 2023-10-17 PROCEDURE — 81001 URINALYSIS AUTO W/SCOPE: CPT | Performed by: EMERGENCY MEDICINE

## 2023-10-17 PROCEDURE — 99285 EMERGENCY DEPT VISIT HI MDM: CPT

## 2023-10-17 PROCEDURE — 96360 HYDRATION IV INFUSION INIT: CPT

## 2023-10-17 PROCEDURE — 99284 EMERGENCY DEPT VISIT MOD MDM: CPT

## 2023-10-17 PROCEDURE — 80053 COMPREHEN METABOLIC PANEL: CPT | Performed by: EMERGENCY MEDICINE

## 2023-10-17 PROCEDURE — 87086 URINE CULTURE/COLONY COUNT: CPT | Performed by: EMERGENCY MEDICINE

## 2023-10-17 RX ORDER — POTASSIUM CHLORIDE 20 MEQ/1
40 TABLET, EXTENDED RELEASE ORAL ONCE
Status: COMPLETED | OUTPATIENT
Start: 2023-10-17 | End: 2023-10-17

## 2023-10-17 NOTE — ED INITIAL ASSESSMENT (HPI)
Per EMS, pt sent in for a thyroid US. Pt reports he listened to her neck with stethoscope and \"heard something\" & nurses said they will do blood work after pt comes back from ER. Pt denies any issues.  Hypotensive upon arrival, tachy low 110's with temp 99.3

## 2023-10-17 NOTE — ED QUICK NOTES
Writing RN called Mervin Stanton of [facility] to inquire reason pt was sent to ER per Dr. Agus Heath request. Pt's RN Tracee Bradshaw, she was told by Dr. Man Salmeron that pt's thyroid levels from a couple of days ago were \"really low\", unable to give lab levels. Dr. Agus Heath notified.

## 2023-10-18 NOTE — ED QUICK NOTES
Rounding Completed    Plan of Care reviewed. Waiting for disposition. Elimination needs assessed. Provided K rider. Bed is locked and in lowest position. Call light within reach.

## 2023-10-18 NOTE — DISCHARGE INSTRUCTIONS
Case was discussed with the endocrinologist.  They felt this can be further addressed as an outpatient. Patient should follow-up with the recommended endocrinology group. Return to the emergency room for changes or worsening or any concerns. Patient should have potassium rechecked later in the week. Patient should follow-up with the primary care physician at the facility for further evaluation. Read all instructions for further recommendations.

## (undated) NOTE — LETTER
[address] [facility]  Authorization for Invasive Procedures  Date: 10/2/2023         Time: Andrew Saunders    I hereby authorize Dr Shabana Carter, my physician and his/her assistants (if applicable), which may include medical students, residents, and/or fellows, to perform the following surgical operation/ procedure and administer such anesthesia as may be determined necessary by my physician: esophagogastroduodenoscopy on Pat Sandoval  2. I recognize that during the surgical operation/procedure, unforeseen conditions may necessitate additional or different procedures than those listed above. I, therefore, further authorize and request that the above-named surgeon, assistants, or designees perform such procedures as are, in their judgment, necessary and desirable. 3.   My surgeon/physician has discussed prior to my surgery the potential benefits, risks and side effects of this procedure; the likelihood of achieving goals; and potential problems that might occur during recuperation. They also discussed reasonable alternatives to the procedure, including risks, benefits, and side effects related to the alternatives and risks related to not receiving this procedure. I have had all my questions answered and I acknowledge that no guarantee has been made as to the result that may be obtained. 4.   Should the need arise during my operation/procedure, which includes change of level of care prior to discharge, I also consent to the administration of blood and/or blood products. Further, I understand that despite careful testing and screening of blood or blood products by collecting agencies, I may still be subject to ill effects as a result of receiving a blood transfusion and/or blood products.   The following are some, but not all, of the potential risks that can occur: fever and allergic reactions, hemolytic reactions, transmission of diseases such as Hepatitis, AIDS and Cytomegalovirus (CMV) and fluid overload. In the event that I wish to have an autologous transfusion of my own blood, or a directed donor transfusion, I will discuss this with my physician. Check only if Refusing Blood or Blood Products  I understand refusal of blood or blood products as deemed necessary by my physician may have serious consequences to my condition to include possible death. I hereby assume responsibility for my refusal and release the hospital, its personnel, and my physicians from any responsibility for the consequences of my refusal.         o  Refuse         5. I authorize the use of any specimen, organs, tissues, body parts or foreign objects that may be removed from my body during the operation/procedure for diagnosis, research or teaching purposes and their subsequent disposal by hospital authorities. I also authorize the release of specimen test results and/or written reports to my treating physician on the hospital medical staff or other referring or consulting physicians involved in my care, at the discretion of the Pathologist or my treating physician. 6.   I consent to the photographing or videotaping of the operations or procedures to be performed, including appropriate portions of my body for medical, scientific, or educational purposes, provided my identity is not revealed by the pictures or by descriptive texts accompanying them. If the procedure has been photographed/videotaped, the surgeon will obtain the original picture, image, videotape or CD. The hospital will not be responsible for storage, release or maintenance of the picture, image, tape or CD.    7.   I consent to the presence of a  or observers in the operating room as deemed necessary by my physician or their designees. 8.   I recognize that in the event my procedure results in extended X-Ray/fluoroscopy time, I may develop a skin reaction. 9.  If I have a Do Not Attempt Resuscitation (DNAR) order in place, that status will be suspended while in the operating room, procedural suite, and during the recovery period unless otherwise explicitly stated by me (or a person authorized to consent on my behalf). The surgeon or my attending physician will determine when the applicable recovery period ends for purposes of reinstating the DNAR order. 10. Patients having a sterilization procedure: I understand that if the procedure is successful the results will be permanent and it will therefore be impossible for me to inseminate, conceive, or bear children. I also understand that the procedure is intended to result in sterility, although the result has not been guaranteed. 11. I acknowledge that my physician has explained sedation/analgesia administration to me including the risk and benefits I consent to the administration of sedation/analgesia as may be necessary or desirable in the judgment of my physician. I CERTIFY THAT I HAVE READ AND FULLY UNDERSTAND THE ABOVE CONSENT TO OPERATION and/or OTHER PROCEDURE.        ____________________________________       _________________________________      ______________________________  Signature of Patient         Signature of Responsible Person        Printed Name of Responsible Person        ____________________________________      _________________________________      ______________________________       Signature of Witness          Relationship to Patient                       Date                                       Time    Patient Name: Tor Chavarria     : 1960                 Printed: 2023      Medical Record #: [id number]                      Page 1 of 2          [place] My signature below affirms that prior to the time of the procedure; I have explained to the patient and/or his/her legal representative, the risks and benefits involved in the proposed treatment and any reasonable alternative to the proposed treatment.  I have also explained the risks and benefits involved in refusal of the proposed treatment and alternatives to the proposed treatment and have answered the patient's questions.  If I have a significant financial interest in a co-management agreement or a significant financial interest in any product or implant, or other significant relationship used in this procedure/surgery, I have disclosed this and had a discussion with my patient.     _______________________________________________________________ _____________________________  Emanuel Pastel of Physician)                                                                                         (Date)                                   (Time)    Patient Name: Lora Lambert     : 1960                 Printed: 2023      Medical Record #: [id number]                      Page 2 of 2

## (undated) NOTE — LETTER
Hayder Jimenez [address], [place], [place]  [address]  INFORMED CONSENT FOR TRANSFUSION OF BLOOD OR BLOOD PRODUCTS  My physician has informed me of the nature, purpose, benefits and risks of transfusion for blood and blood components that he/she may deem necessary during my treatment or hospitalization. He/she has also discussed alternatives to receiving blood from the voluntary blood supply with me, such as self-donation (autologous) and directed donation (blood donated by family or friends to be used specifically for me). I further understand that while the [address] [place] [address] will attempt to supply any autologous or directed donor blood prior to transfusion of blood from the routine blood supply, medical circumstances may require that other or additional blood components may be required for my care. In giving consent, I acknowledge that my physician has also informed me that despite careful screening and testing in accordance with national and regional regulations, there is still a small risk of transmission of infectious agents including hepatitis, HIV-1/2, cytomegalovirus and other viruses or diseases as yet unknown for which licensed definitive screening tests do not currently exist. Additionally, my physician has informed me of the potential for transfusion reactions not related to an infectious agent. [  ]  Check here for Recurring Outpatient Transfusion Therapy (valid for 1 year) In addition to the above, my physician has informed me that I shall receive numerous transfusions over a period of time and that these can lead to other increased risks. I hereby authorize the transfusion of blood and/or blood products to me as deemed necessary and ordered by physicians participating in my care.  My physician has given me the opportunity to ask questions and any questions asked have been answered to my satisfaction  __________________________________________ ______________________________________________  (Signature of Patient)                                                            (Responsible party in case of Minor,                                                                                                 Incompetent, or unconscious Patient)  ___________________________________________       ________ ______________________________________  (Relationship to Patient)                                                       (Signature of Witness)  ______________________________________________________________________________________________   (Date)                                                                           (Time)  REFUSAL OF CONSENT FOR BLOOD TRANSFUSIONS   Sign only if Refusing   [  ] I understand refusal of blood or blood products as deemed necessary by my physician may have serious consequences to my condition to include possible death.  I hereby assume responsibility for my refusal and release the hospital, its personnel, and my physicians from any responsibility for the consequences of my refusal.    ________________________________________________________________________________  (Signature of Patient)                                                         (Responsible Party/Relationship to Patient)    ________________________________________________________________________________  (Signature of Witness)                                                       (Date/Time)     Patient Name: Lora Lambert     : 1960                 Printed: 10/2/2023      Medical Record #: [id number]                                 Page 1 of 1